# Patient Record
Sex: FEMALE | Race: WHITE | NOT HISPANIC OR LATINO | URBAN - METROPOLITAN AREA
[De-identification: names, ages, dates, MRNs, and addresses within clinical notes are randomized per-mention and may not be internally consistent; named-entity substitution may affect disease eponyms.]

---

## 2018-12-17 NOTE — H&P ADULT - NSHPPHYSICALEXAM_GEN_ALL_CORE
MSK: + decreased ROM secondary to pain, cervical spine      Remainder of exam per medical clearance note

## 2018-12-17 NOTE — H&P ADULT - HISTORY OF PRESENT ILLNESS
58F c.o neck pain x       Present for Anterior Cervical Discectomy with Fusion C4-C6 58F c.o neck pain x 3 years without radiation of pain.  Pt notes numbness and tingling down upper extremities left more severe than right.  Pt has been prescribed oxycodone and ibuprofen for her pain and states despite this her pain has failed to be well managed.    Pt has attempted and failed conservative treatment for her neck pain.  Present for Anterior Cervical Discectomy with Fusion C4-C6.  Upon further questioning with anesthesia pt states she has had an episode of severe SOB with accompanying chest pain on November 15 2018.  Anesthesia and Dr. Donaldson discussed the situation and decided to cancel the case until further pt follow up for possible cardiac stress test. 58F c.o neck pain x 3 years without radiation of pain.  Pt notes numbness and tingling down upper extremities left more severe than right.  Pt has been prescribed oxycodone and ibuprofen for her pain and states despite this her pain has failed to be well managed.  Pt denies fevers, chills, recent illness, cp, or SOB  Pt has attempted and failed conservative treatment for her neck pain.  Present for Anterior Cervical Discectomy with Fusion C4-C6.  Upon further questioning with anesthesia pt states she has had an episode of severe SOB with accompanying chest pain on November 15 2018.  Anesthesia and Dr. Donaldson discussed the situation and decided to cancel the case until further medical follow up for possible cardiac stress test.

## 2018-12-17 NOTE — H&P ADULT - PROBLEM SELECTOR PLAN 1
Admit to Orthopaedic Service.  Presents today for elective ACDF C4-C6  Pt medically stable and cleared for procedure today by  Admit to Orthopaedic Service.  Presents today for elective ACDF C4-C6  Pt medically stable and cleared for procedure today by Dr. NIK Donaldson Follow up outpatient for further medical workup and possible cardiac stress test.  Reschedule elective ACDF C4-C6 pending medical follow up.

## 2018-12-17 NOTE — H&P ADULT - PMH
Asthma, unspecified asthma severity, unspecified whether complicated, unspecified whether persistent    Attention deficit disorder, unspecified hyperactivity presence No pertinent past medical history

## 2018-12-18 ENCOUNTER — INPATIENT (INPATIENT)
Facility: HOSPITAL | Age: 58
LOS: 0 days | Discharge: HOME | DRG: 74 | End: 2018-12-18
Attending: ORTHOPAEDIC SURGERY | Admitting: ORTHOPAEDIC SURGERY
Payer: OTHER MISCELLANEOUS

## 2018-12-18 VITALS
RESPIRATION RATE: 18 BRPM | HEART RATE: 70 BPM | WEIGHT: 157.85 LBS | TEMPERATURE: 96 F | OXYGEN SATURATION: 97 % | HEIGHT: 64 IN | DIASTOLIC BLOOD PRESSURE: 67 MMHG | SYSTOLIC BLOOD PRESSURE: 115 MMHG

## 2018-12-18 DIAGNOSIS — M54.12 RADICULOPATHY, CERVICAL REGION: ICD-10-CM

## 2018-12-18 DIAGNOSIS — J45.909 UNSPECIFIED ASTHMA, UNCOMPLICATED: ICD-10-CM

## 2018-12-18 DIAGNOSIS — Z90.49 ACQUIRED ABSENCE OF OTHER SPECIFIED PARTS OF DIGESTIVE TRACT: Chronic | ICD-10-CM

## 2018-12-18 DIAGNOSIS — Z98.890 OTHER SPECIFIED POSTPROCEDURAL STATES: Chronic | ICD-10-CM

## 2018-12-18 PROCEDURE — 86850 RBC ANTIBODY SCREEN: CPT

## 2018-12-18 PROCEDURE — 86901 BLOOD TYPING SEROLOGIC RH(D): CPT

## 2018-12-18 PROCEDURE — 86900 BLOOD TYPING SEROLOGIC ABO: CPT

## 2018-12-20 DIAGNOSIS — J45.909 UNSPECIFIED ASTHMA, UNCOMPLICATED: ICD-10-CM

## 2018-12-20 DIAGNOSIS — M54.12 RADICULOPATHY, CERVICAL REGION: ICD-10-CM

## 2018-12-20 DIAGNOSIS — Z53.09 PROCEDURE AND TREATMENT NOT CARRIED OUT BECAUSE OF OTHER CONTRAINDICATION: ICD-10-CM

## 2018-12-20 DIAGNOSIS — Z79.899 OTHER LONG TERM (CURRENT) DRUG THERAPY: ICD-10-CM

## 2019-01-03 PROBLEM — M54.12 RADICULOPATHY, CERVICAL REGION: Chronic | Status: ACTIVE | Noted: 2018-12-17

## 2019-01-07 VITALS
WEIGHT: 161.6 LBS | DIASTOLIC BLOOD PRESSURE: 59 MMHG | OXYGEN SATURATION: 96 % | HEIGHT: 66 IN | TEMPERATURE: 97 F | SYSTOLIC BLOOD PRESSURE: 100 MMHG | HEART RATE: 79 BPM | RESPIRATION RATE: 16 BRPM

## 2019-01-07 NOTE — H&P ADULT - HISTORY OF PRESENT ILLNESS
58 year old female presents with neck pain x     Presents today for elective ACDF C4-C6. 58 year old female presents with neck pain x chronic. Pt endorses work related injury in 2013 while working as a  doing heavy lifting and repeated overhand movements. Pt endorses bilateral numbness/tingling/weakness of her upper extremities which is worse on her left. Endorses bilateral lower extremity pain that is also worse on the left side. Pt takes ibuprofen at home; pt does not ambulate with an assistive device at baseline. Denies DVT hx. Denies CP, SOB, N/V, tactile fevers. Pt has failed conservative treatment for her symptoms.    Presents today for elective ACDF C4-C6.

## 2019-01-07 NOTE — H&P ADULT - PROBLEM SELECTOR PLAN 1
Admit to Orthopaedic Service.  Presents today for elective ACDF C4-C6  Pt medically stable and cleared for procedure today by Dr. Donaldson

## 2019-01-07 NOTE — H&P ADULT - NSHPPHYSICALEXAM_GEN_ALL_CORE
MSK: +decreased ROM cervical spine secondary to pain   Remainder of physical exam as per medical clearance note MSK: +decreased ROM cervical spine secondary to pain   Sensation intact to bilateral UE distally, decreased at palmar aspect of left hand. Motor Strength 5/5 to /interossei/triceps/biceps/deltoid bilaterally. AIN/PIN intact.   Skin warm and well perfused, no visible wounds/erythema/ecchymoses  Cap refill < 2 sec bilateral upper extremities   Remainder of physical exam as per medical clearance note

## 2019-01-08 ENCOUNTER — INPATIENT (INPATIENT)
Facility: HOSPITAL | Age: 59
LOS: 4 days | Discharge: HOME | DRG: 30 | End: 2019-01-13
Attending: ORTHOPAEDIC SURGERY | Admitting: ORTHOPAEDIC SURGERY
Payer: OTHER MISCELLANEOUS

## 2019-01-08 DIAGNOSIS — Z98.890 OTHER SPECIFIED POSTPROCEDURAL STATES: Chronic | ICD-10-CM

## 2019-01-08 DIAGNOSIS — Z90.49 ACQUIRED ABSENCE OF OTHER SPECIFIED PARTS OF DIGESTIVE TRACT: Chronic | ICD-10-CM

## 2019-01-08 DIAGNOSIS — M54.12 RADICULOPATHY, CERVICAL REGION: ICD-10-CM

## 2019-01-08 LAB
ANION GAP SERPL CALC-SCNC: 13 MMOL/L — SIGNIFICANT CHANGE UP (ref 5–17)
BUN SERPL-MCNC: 20 MG/DL — SIGNIFICANT CHANGE UP (ref 7–23)
CALCIUM SERPL-MCNC: 8.3 MG/DL — LOW (ref 8.4–10.5)
CHLORIDE SERPL-SCNC: 104 MMOL/L — SIGNIFICANT CHANGE UP (ref 96–108)
CO2 SERPL-SCNC: 22 MMOL/L — SIGNIFICANT CHANGE UP (ref 22–31)
CREAT SERPL-MCNC: 0.6 MG/DL — SIGNIFICANT CHANGE UP (ref 0.5–1.3)
GLUCOSE SERPL-MCNC: 148 MG/DL — HIGH (ref 70–99)
POTASSIUM SERPL-MCNC: 4.3 MMOL/L — SIGNIFICANT CHANGE UP (ref 3.5–5.3)
POTASSIUM SERPL-SCNC: 4.3 MMOL/L — SIGNIFICANT CHANGE UP (ref 3.5–5.3)
SODIUM SERPL-SCNC: 139 MMOL/L — SIGNIFICANT CHANGE UP (ref 135–145)

## 2019-01-08 RX ORDER — METOCLOPRAMIDE HCL 10 MG
10 TABLET ORAL ONCE
Qty: 0 | Refills: 0 | Status: DISCONTINUED | OUTPATIENT
Start: 2019-01-08 | End: 2019-01-13

## 2019-01-08 RX ORDER — OXYCODONE HYDROCHLORIDE 5 MG/1
5 TABLET ORAL EVERY 4 HOURS
Qty: 0 | Refills: 0 | Status: DISCONTINUED | OUTPATIENT
Start: 2019-01-08 | End: 2019-01-13

## 2019-01-08 RX ORDER — HYDROMORPHONE HYDROCHLORIDE 2 MG/ML
0.5 INJECTION INTRAMUSCULAR; INTRAVENOUS; SUBCUTANEOUS
Qty: 0 | Refills: 0 | Status: DISCONTINUED | OUTPATIENT
Start: 2019-01-08 | End: 2019-01-13

## 2019-01-08 RX ORDER — MAGNESIUM HYDROXIDE 400 MG/1
30 TABLET, CHEWABLE ORAL EVERY 12 HOURS
Qty: 0 | Refills: 0 | Status: DISCONTINUED | OUTPATIENT
Start: 2019-01-08 | End: 2019-01-13

## 2019-01-08 RX ORDER — CEFAZOLIN SODIUM 1 G
2000 VIAL (EA) INJECTION EVERY 8 HOURS
Qty: 0 | Refills: 0 | Status: COMPLETED | OUTPATIENT
Start: 2019-01-08 | End: 2019-01-08

## 2019-01-08 RX ORDER — OXYCODONE HYDROCHLORIDE 5 MG/1
10 TABLET ORAL EVERY 4 HOURS
Qty: 0 | Refills: 0 | Status: DISCONTINUED | OUTPATIENT
Start: 2019-01-08 | End: 2019-01-13

## 2019-01-08 RX ORDER — BENZOCAINE AND MENTHOL 5; 1 G/100ML; G/100ML
1 LIQUID ORAL EVERY 4 HOURS
Qty: 0 | Refills: 0 | Status: DISCONTINUED | OUTPATIENT
Start: 2019-01-08 | End: 2019-01-13

## 2019-01-08 RX ORDER — DOCUSATE SODIUM 100 MG
100 CAPSULE ORAL THREE TIMES A DAY
Qty: 0 | Refills: 0 | Status: DISCONTINUED | OUTPATIENT
Start: 2019-01-08 | End: 2019-01-13

## 2019-01-08 RX ORDER — SODIUM CHLORIDE 9 MG/ML
1000 INJECTION, SOLUTION INTRAVENOUS
Qty: 0 | Refills: 0 | Status: DISCONTINUED | OUTPATIENT
Start: 2019-01-08 | End: 2019-01-10

## 2019-01-08 RX ORDER — CEFAZOLIN SODIUM 1 G
2000 VIAL (EA) INJECTION EVERY 8 HOURS
Qty: 0 | Refills: 0 | Status: DISCONTINUED | OUTPATIENT
Start: 2019-01-08 | End: 2019-01-08

## 2019-01-08 RX ORDER — SENNA PLUS 8.6 MG/1
2 TABLET ORAL AT BEDTIME
Qty: 0 | Refills: 0 | Status: DISCONTINUED | OUTPATIENT
Start: 2019-01-08 | End: 2019-01-13

## 2019-01-08 RX ORDER — DEXAMETHASONE 0.5 MG/5ML
10 ELIXIR ORAL EVERY 8 HOURS
Qty: 0 | Refills: 0 | Status: COMPLETED | OUTPATIENT
Start: 2019-01-08 | End: 2019-01-09

## 2019-01-08 RX ADMIN — HYDROMORPHONE HYDROCHLORIDE 0.5 MILLIGRAM(S): 2 INJECTION INTRAMUSCULAR; INTRAVENOUS; SUBCUTANEOUS at 20:40

## 2019-01-08 RX ADMIN — Medication 102 MILLIGRAM(S): at 15:34

## 2019-01-08 RX ADMIN — HYDROMORPHONE HYDROCHLORIDE 0.5 MILLIGRAM(S): 2 INJECTION INTRAMUSCULAR; INTRAVENOUS; SUBCUTANEOUS at 11:00

## 2019-01-08 RX ADMIN — BENZOCAINE AND MENTHOL 1 LOZENGE: 5; 1 LIQUID ORAL at 14:48

## 2019-01-08 RX ADMIN — OXYCODONE HYDROCHLORIDE 10 MILLIGRAM(S): 5 TABLET ORAL at 22:03

## 2019-01-08 RX ADMIN — Medication 100 MILLIGRAM(S): at 22:01

## 2019-01-08 RX ADMIN — HYDROMORPHONE HYDROCHLORIDE 0.5 MILLIGRAM(S): 2 INJECTION INTRAMUSCULAR; INTRAVENOUS; SUBCUTANEOUS at 10:26

## 2019-01-08 RX ADMIN — SODIUM CHLORIDE 125 MILLILITER(S): 9 INJECTION, SOLUTION INTRAVENOUS at 10:26

## 2019-01-08 RX ADMIN — HYDROMORPHONE HYDROCHLORIDE 0.5 MILLIGRAM(S): 2 INJECTION INTRAMUSCULAR; INTRAVENOUS; SUBCUTANEOUS at 15:44

## 2019-01-08 RX ADMIN — Medication 2000 MILLIGRAM(S): at 22:01

## 2019-01-08 RX ADMIN — Medication 102 MILLIGRAM(S): at 22:01

## 2019-01-08 RX ADMIN — OXYCODONE HYDROCHLORIDE 10 MILLIGRAM(S): 5 TABLET ORAL at 23:03

## 2019-01-08 RX ADMIN — OXYCODONE HYDROCHLORIDE 10 MILLIGRAM(S): 5 TABLET ORAL at 17:34

## 2019-01-08 RX ADMIN — Medication 2000 MILLIGRAM(S): at 15:35

## 2019-01-08 RX ADMIN — SENNA PLUS 2 TABLET(S): 8.6 TABLET ORAL at 22:01

## 2019-01-08 RX ADMIN — HYDROMORPHONE HYDROCHLORIDE 0.5 MILLIGRAM(S): 2 INJECTION INTRAMUSCULAR; INTRAVENOUS; SUBCUTANEOUS at 20:12

## 2019-01-08 RX ADMIN — OXYCODONE HYDROCHLORIDE 10 MILLIGRAM(S): 5 TABLET ORAL at 16:46

## 2019-01-08 RX ADMIN — HYDROMORPHONE HYDROCHLORIDE 0.5 MILLIGRAM(S): 2 INJECTION INTRAMUSCULAR; INTRAVENOUS; SUBCUTANEOUS at 14:44

## 2019-01-08 NOTE — PACU DISCHARGE NOTE - COMMENTS
report given to miss kong rn..  patient transported to [address] [place] [address] , monitored , by rn isabel and cna tg.

## 2019-01-08 NOTE — CONSULT NOTE ADULT - SUBJECTIVE AND OBJECTIVE BOX
Pain Management Consult Note - [facility] [phone]    Chief Complaint: Neck Pain    HPI: Patient seen and examined today, patient laying down in bed, in no apparent distress. Patient s/p ACDF C4-C6, post op Day0. Patient Axox3, dressing c,d,i. Reviewed pain medication regimen with patient, patient verbalized understanding.       Pain is ___ sharp __x__dull ___burning _x__achy ___ Intensity: ____ mild _x__mod _x__severe     Location _c___surgical site _c___cervical _____lumbar ____abd ____upper ext____lower ext    Worse with ___x_activity __x__movement _____physical therapy___ Rest    Improved with __x__medication _x___rest ____physical therapy      ROS: Const:  _-__febrile   Eyes:___ENT:___CV: _-__chest pain  Resp: __-__sob  GI:_-__nausea _-__vomiting __-_abd pain ___npo ___clears __full diet __bm  :___ Musk: _x__pain _x__spasm  Skin:___ Neuro:  _-__skldnvwo_-__tupthtqbd_-__ numbness _-__weakness _-__paresth  Psych:_-_anxiety  Endo:___ Heme:___Allergy:_________, _x__all others reviewed and negative      PAST MEDICAL & SURGICAL HISTORY:  Cervical radiculopathy  H/O arthroscopy of shoulder: left  History of appendectomy  CERVICAL [id number]  CERVICAL RADICULOPATHY  Handoff  Cervical radiculopathy  No pertinent past medical history  Attention deficit disorder, unspecified hyperactivity presence  Asthma, unspecified asthma severity, unspecified whether complicated, unspecified whether persistent  Cervical disc herniation  Cervical disc herniation  Cervical radiculopathy  Cervical discectomy with fusion of cervical spine  H/O arthroscopy of shoulder  History of appendectomy      SH: _-__Tobacco   _-__Alcohol                          FH:FAMILY HISTORY:      dexamethasone  IVPB 10 milliGRAM(s) IV Intermittent every 8 hours  HYDROmorphone  Injectable 0.5 milliGRAM(s) IV Push every 15 minutes PRN  HYDROmorphone  Injectable 0.5 milliGRAM(s) IV Push every 2 hours PRN  lactated ringers. 1000 milliLiter(s) IV Continuous <Continuous>  metoclopramide Injectable 10 milliGRAM(s) IV Push once PRN  oxyCODONE    IR 5 milliGRAM(s) Oral every 4 hours PRN  oxyCODONE    IR 10 milliGRAM(s) Oral every 4 hours PRN      T(C): 36.6 (01-08-19 @ 10:30), Max: 36.6 (01-08-19 @ 10:30)  HR: 54 (01-08-19 @ 10:45) (54 - 79)  BP: 137/89 (01-08-19 @ 10:45) (100/59 - 156/82)  RR: 14 (01-08-19 @ 10:45) (11 - 16)  SpO2: 97% (01-08-19 @ 10:45) (94% - 98%)  Wt(kg): --    T(C): 36.6 (01-08-19 @ 10:30), Max: 36.6 (01-08-19 @ 10:30)  HR: 54 (01-08-19 @ 10:45) (54 - 79)  BP: 137/89 (01-08-19 @ 10:45) (100/59 - 156/82)  RR: 14 (01-08-19 @ 10:45) (11 - 16)  SpO2: 97% (01-08-19 @ 10:45) (94% - 98%)  Wt(kg): --    T(C): 36.6 (01-08-19 @ 10:30), Max: 36.6 (01-08-19 @ 10:30)  HR: 54 (01-08-19 @ 10:45) (54 - 79)  BP: 137/89 (01-08-19 @ 10:45) (100/59 - 156/82)  RR: 14 (01-08-19 @ 10:45) (11 - 16)  SpO2: 97% (01-08-19 @ 10:45) (94% - 98%)  Wt(kg): --    PHYSICAL EXAM:  Gen Appearance: _x__no acute distress x___appropriate        Neuro: _x__SILT feet____ EOM Intact Psych: AAOX_3_, _x__mood/affect appropriate        Eyes: _x__conjunctiva WNL  __x___ Pupils equal and round        ENT: _x__ears and nose atraumatic_x__ Hearing grossly intact        Neck: _x__trachea midline, no visible masses ___thyroid without palpable mass    Resp: _x__Nml WOB____No tactile fremitus ___clear to auscultation    Cardio: _x__extremities free from edema __x__pedal pulses palpable    GI/Abdomen: _x__soft ___x__ Nontender___x___Nondistended_____HSM    Lymphatic: ___no palpable nodes in neck  _x__no palpable nodes calves and feet    Skin/Wound: _x__Incision, _x__Dressing c/d/i,   __x__surrounding tissues soft,  ___drain/chest tube present____    Muscular: EHL __5_/5  Gastrocnemius_5__/5    ___absent clubbing/cyanosis      ASSESSMENT: This is a 58y old Female with a history of cervical radiculopathy, s/p ACDF C4-C6, post op day0.      Recommended Treatment PLAN:  1. Oxycodone 5-10mg Po Q4h prn moderate to severe pain   2. Dilaudid 0.5mg Q2h IVP prn breakthrough pain   Plan discussed with Dr. Parris Luciano

## 2019-01-08 NOTE — PROGRESS NOTE ADULT - SUBJECTIVE AND OBJECTIVE BOX
Orthopedics Post Op Check    Procedure: ACDF C4-C6  Surgeon: WILLIAN    Pt. stable, c/o sore throat and pain in neck region. Pt. states she still has numbness in left palm.    Denies any SOB/CP/nausea/vomiting.     Vital Signs Last 24 Hrs  T(C): 36.5 (08 Jan 2019 12:32), Max: 36.6 (08 Jan 2019 10:30)  T(F): 97.7 (08 Jan 2019 12:32), Max: 97.9 (08 Jan 2019 10:30)  HR: 62 (08 Jan 2019 12:32) (54 - 79)  BP: 124/71 (08 Jan 2019 12:32) (100/59 - 156/82)  BP(mean): 75 (08 Jan 2019 12:00) (75 - 128)  RR: 16 (08 Jan 2019 12:32) (9 - 16)  SpO2: 98% (08 Jan 2019 12:32) (94% - 98%)      Dressing C/D/I with 1 drain     Pulses: Brachial/Radial 2+  SLT: intact   Motor: /Biceps/triceps/Delts 5/5 B/L UES     01-08    139  |  104  |  20  ----------------------------<  148<H>  4.3   |  22  |  0.60    Ca    8.3<L>      08 Jan 2019 11:12      Post op XR: pending POD #1    A/P: 58yoFemale POD#0 s/p ACDF C4-C6  - Stable  - Pain Control  - DVT ppx: SCDS  - Post op abx: ANCEF  - PT, WBS: WBAT   - F/U AM Labs

## 2019-01-08 NOTE — BRIEF OPERATIVE NOTE - PROCEDURE
<<-----Click on this checkbox to enter Procedure Cervical discectomy with fusion of cervical spine  01/08/2019    Active  YOBANY

## 2019-01-09 LAB
ANION GAP SERPL CALC-SCNC: 15 MMOL/L — SIGNIFICANT CHANGE UP (ref 5–17)
BASOPHILS NFR BLD AUTO: 0.1 % — SIGNIFICANT CHANGE UP (ref 0–2)
BUN SERPL-MCNC: 12 MG/DL — SIGNIFICANT CHANGE UP (ref 7–23)
CALCIUM SERPL-MCNC: 8.7 MG/DL — SIGNIFICANT CHANGE UP (ref 8.4–10.5)
CHLORIDE SERPL-SCNC: 100 MMOL/L — SIGNIFICANT CHANGE UP (ref 96–108)
CO2 SERPL-SCNC: 23 MMOL/L — SIGNIFICANT CHANGE UP (ref 22–31)
CREAT SERPL-MCNC: 0.47 MG/DL — LOW (ref 0.5–1.3)
GLUCOSE SERPL-MCNC: 155 MG/DL — HIGH (ref 70–99)
HCT VFR BLD CALC: 39.2 % — SIGNIFICANT CHANGE UP (ref 34.5–45)
HGB BLD-MCNC: 12.5 G/DL — SIGNIFICANT CHANGE UP (ref 11.5–15.5)
LYMPHOCYTES # BLD AUTO: 14.8 % — SIGNIFICANT CHANGE UP (ref 13–44)
MCHC RBC-ENTMCNC: 29.1 PG — SIGNIFICANT CHANGE UP (ref 27–34)
MCHC RBC-ENTMCNC: 31.9 G/DL — LOW (ref 32–36)
MCV RBC AUTO: 91.2 FL — SIGNIFICANT CHANGE UP (ref 80–100)
MONOCYTES NFR BLD AUTO: 2.2 % — SIGNIFICANT CHANGE UP (ref 2–14)
NEUTROPHILS NFR BLD AUTO: 82.9 % — HIGH (ref 43–77)
PLATELET # BLD AUTO: 231 K/UL — SIGNIFICANT CHANGE UP (ref 150–400)
POTASSIUM SERPL-MCNC: 4 MMOL/L — SIGNIFICANT CHANGE UP (ref 3.5–5.3)
POTASSIUM SERPL-SCNC: 4 MMOL/L — SIGNIFICANT CHANGE UP (ref 3.5–5.3)
RBC # BLD: 4.3 M/UL — SIGNIFICANT CHANGE UP (ref 3.8–5.2)
RBC # FLD: 14.8 % — SIGNIFICANT CHANGE UP (ref 10.3–16.9)
SODIUM SERPL-SCNC: 138 MMOL/L — SIGNIFICANT CHANGE UP (ref 135–145)
WBC # BLD: 13.2 K/UL — HIGH (ref 3.8–10.5)
WBC # FLD AUTO: 13.2 K/UL — HIGH (ref 3.8–10.5)

## 2019-01-09 PROCEDURE — 99223 1ST HOSP IP/OBS HIGH 75: CPT

## 2019-01-09 PROCEDURE — 72040 X-RAY EXAM NECK SPINE 2-3 VW: CPT | Mod: 26

## 2019-01-09 RX ORDER — DIAZEPAM 5 MG
5 TABLET ORAL EVERY 8 HOURS
Qty: 0 | Refills: 0 | Status: DISCONTINUED | OUTPATIENT
Start: 2019-01-09 | End: 2019-01-10

## 2019-01-09 RX ADMIN — OXYCODONE HYDROCHLORIDE 10 MILLIGRAM(S): 5 TABLET ORAL at 14:09

## 2019-01-09 RX ADMIN — Medication 5 MILLIGRAM(S): at 09:57

## 2019-01-09 RX ADMIN — SENNA PLUS 2 TABLET(S): 8.6 TABLET ORAL at 22:03

## 2019-01-09 RX ADMIN — OXYCODONE HYDROCHLORIDE 10 MILLIGRAM(S): 5 TABLET ORAL at 05:44

## 2019-01-09 RX ADMIN — Medication 102 MILLIGRAM(S): at 05:45

## 2019-01-09 RX ADMIN — MAGNESIUM HYDROXIDE 30 MILLILITER(S): 400 TABLET, CHEWABLE ORAL at 05:48

## 2019-01-09 RX ADMIN — OXYCODONE HYDROCHLORIDE 10 MILLIGRAM(S): 5 TABLET ORAL at 15:09

## 2019-01-09 RX ADMIN — Medication 100 MILLIGRAM(S): at 22:02

## 2019-01-09 RX ADMIN — OXYCODONE HYDROCHLORIDE 10 MILLIGRAM(S): 5 TABLET ORAL at 06:44

## 2019-01-09 RX ADMIN — Medication 100 MILLIGRAM(S): at 05:44

## 2019-01-09 RX ADMIN — OXYCODONE HYDROCHLORIDE 10 MILLIGRAM(S): 5 TABLET ORAL at 23:00

## 2019-01-09 RX ADMIN — Medication 100 MILLIGRAM(S): at 14:09

## 2019-01-09 RX ADMIN — OXYCODONE HYDROCHLORIDE 10 MILLIGRAM(S): 5 TABLET ORAL at 22:05

## 2019-01-09 RX ADMIN — BENZOCAINE AND MENTHOL 1 LOZENGE: 5; 1 LIQUID ORAL at 09:57

## 2019-01-09 RX ADMIN — Medication 5 MILLIGRAM(S): at 18:09

## 2019-01-09 NOTE — DISCHARGE NOTE ADULT - CARE PROVIDER_API CALL
Leoncio Donaldson (DO), Orthopaedic Surgery  [address] [place], [place] [id number]  Phone: [phone]  Fax: [phone]

## 2019-01-09 NOTE — SWALLOW FEES ASSESSMENT ADULT - DIAGNOSTIC IMPRESSIONS
Pt p/w a mild pharyngeal dysphagia characterized by pharyngeal residue after the swallow, greater for puree than thin, likely due to decreased pharyngeal squeeze in setting of ACDF on 1/8/19. Residue is reduced with secondary swallow & a liquid wash. Suspect that swallow function will return to baseline over the next week to two weeks. Pt is safe to continue with an oral diet.

## 2019-01-09 NOTE — SWALLOW FEES ASSESSMENT ADULT - PHARYNGEAL PHASE COMMENTS
No penetration or aspiration observed. Mild pharyngeal residue noted throughout the pharynx after the swallow, greater for puree than thin, likely r/t pharyngeal edema causing decreased squeeze.

## 2019-01-09 NOTE — DISCHARGE NOTE ADULT - PATIENT PORTAL LINK FT
You can access the [facility] Patient Portal, offered by [facility], by registering with the following website: [email or web address]

## 2019-01-09 NOTE — PROGRESS NOTE ADULT - SUBJECTIVE AND OBJECTIVE BOX
Pt seen and examined. Pt reported muscle tightness and pain in left shoulder. Pt seen and examined. Pt reported muscle tightness and pain in left shoulder. Pt reported sore throat and discomfort with swallowing.    Focused exam:  ACDF C4-6 cervical collar in place  Dsg c/d/i  HVx1  b/l UE 5/5 , bi/tri/delt  b/l UE SILT and WWP      A/P: 59yo female s/p ACDF C4-6    -DVT PPX: SCDs  -WBS: WBAT  -Pain control: Recs appreciated from Pain Mgmt team, Valium added for muscle tightness  -Disp: Pending    Post-op xrays gained.  Dr. Donaldson to bedside, d/t pt's discomfort with swallowing diet to be slowly advanced and ENT consult to be gained. Pt provided with cepacol lozenges for sore throat. Post-op xrays to be gained on 1/10/19.       Recs from Dr. Quezada appreciated.

## 2019-01-09 NOTE — SWALLOW FEES ASSESSMENT ADULT - COMMENTS
Mild pharyngeal edema, ?hypomobile left TVF (please see ENT note for additional documentation) +LEP but able to participate in at least semi-complex conversation in English. Voice quality is WNL, loudness is mildly reduced. Pt seen in coordination with ENT residents at request of Dr. Dixon to visualize TVF movement & visualize pharyngeal swallow. Pt was informed of purpose of exam & provided verbal consent for scope. She tolerated the passing & presence of the scope without difficulty.

## 2019-01-09 NOTE — DISCHARGE NOTE ADULT - MEDICATION SUMMARY - MEDICATIONS TO TAKE
I will START or STAY ON the medications listed below when I get home from the hospital:    Arlene loomis  s/p ACDF   -- Indication: For Spine surgery    MethylPREDNISolone Dose Pack 4 mg oral tablet  -- Please follow steroid dose pack instructions  -- It is very important that you take or use this exactly as directed.  Do not skip doses or discontinue unless directed by your doctor.  Obtain medical advice before taking any non-prescription drugs as some may affect the action of this medication.  Take with food or milk.    -- Indication: For Steroid    oxyCODONE 5 mg oral tablet  -- 1 tab (5mg) po q6h prn moderate pain (4-6) or 2 tabs (10mg) po q6h prn severe pain (7-10), wean off as isak MDD:5 tabs  -- Indication: For Pain    lidocaine 5% topical film  -- Apply patch to L Trap, on for 12 hours off for 12 hours, as needed for pain  -- Indication: For Pain    senna oral tablet  -- 2 tab(s) by mouth once a day (at bedtime)  -- Indication: For Constipation    docusate sodium 100 mg oral capsule  -- 1 cap(s) by mouth 3 times a day  -- Indication: For Constipation I will START or STAY ON the medications listed below when I get home from the hospital:    Arlene loomis  s/p ACDF   -- Indication: For Spine surgery    MethylPREDNISolone Dose Pack 4 mg oral tablet  -- Please follow steroid dose pack instructions  -- It is very important that you take or use this exactly as directed.  Do not skip doses or discontinue unless directed by your doctor.  Obtain medical advice before taking any non-prescription drugs as some may affect the action of this medication.  Take with food or milk.    -- Indication: For Postoperative care    oxyCODONE 5 mg oral tablet  -- 1 tab (5mg) po q6h prn moderate pain (4-6) or 2 tabs (10mg) po q6h prn severe pain (7-10), wean off as isak MDD:5 tabs  -- Indication: For postoperative pain control    lidocaine 5% topical film  -- Apply patch to L Trap, on for 12 hours off for 12 hours, as needed for pain  -- Indication: For Pain    senna oral tablet  -- 2 tab(s) by mouth once a day (at bedtime)  -- Indication: For Constipation    docusate sodium 100 mg oral capsule  -- 1 cap(s) by mouth 3 times a day  -- Indication: For Constipation    Colace 100 mg oral capsule  -- 1 cap(s) by mouth 3 times a day, As Needed -for constipation   -- Medication should be taken with plenty of water.    -- Indication: For Constipation    senna oral tablet  -- 2 tab(s) by mouth once a day (at bedtime), As Needed -for constipation   -- Indication: For Constipation

## 2019-01-09 NOTE — PROGRESS NOTE ADULT - SUBJECTIVE AND OBJECTIVE BOX
pt seen and examined nad avss some issues with swallowing     pe dressing cdi   nvi   power 5/5     sensation grossly intact   no calf tenderness    imp sp acdf    plan   drain  pt   pain control  scd  xrays   ent consult

## 2019-01-09 NOTE — DISCHARGE NOTE ADULT - HOSPITAL COURSE
Admitted  Surgery - underwent ACDF  Erika-op Antibiotics  Pain control  DVT prophylaxis  OOB/Physical Therapy Admitted  Surgery - underwent ACDF  Erika-op Antibiotics  Pain control  DVT prophylaxis  OOB  Consults: Pain Mgmt, IM, ENT

## 2019-01-09 NOTE — CONSULT NOTE ADULT - SUBJECTIVE AND OBJECTIVE BOX
CC: Complaining of throat pain and left shoulder pain.   No other complaints.   Denies cp, sob, dizziness.   Rest of ROS negative.     Vital Signs Last 24 Hrs  T(C): 36.6 (09 Jan 2019 09:53), Max: 37.4 (08 Jan 2019 21:05)  T(F): 97.8 (09 Jan 2019 09:53), Max: 99.3 (08 Jan 2019 21:05)  HR: 77 (09 Jan 2019 09:53) (54 - 77)  BP: 115/56 (09 Jan 2019 09:53) (99/54 - 150/82)  BP(mean): 75 (08 Jan 2019 12:00) (75 - 117)  RR: 16 (09 Jan 2019 09:53) (9 - 17)  SpO2: 95% (09 Jan 2019 09:53) (93% - 98%)    PHYSICAL EXAMINATION  * General: Not in acute distress. Awake and alert. Lying comfortably in bed.  * Neck: C-collar. Drain in place.   * Lungs: Clear to auscultation, no rales, no wheezes.  * Cardio: Regular rate and rhythm, no murmurs, no rubs, no gallops. Good peripheral pulses.  * Abdomen: Soft, non-tender, non-distended, tympanic to percussion, no rebound, no guarding, no rigidity. Bowel sounds present. No suprapubic or CVA tenderness.  * : Deferred.  * Extremities: Acyanotic, no edema.  * Skin: Warm and dry.  * Neuro: Alert and oriented x 3. No focal deficits. Motor strength is 5/5 throughout. Sensation intact. Cranial nerves II-XII grossly intact.                           12.5   13.2  )-----------( 231      ( 09 Jan 2019 05:40 )             39.2   01-09    138  |  100  |  12  ----------------------------<  155<H>  4.0   |  23  |  0.47<L>    Ca    8.7      09 Jan 2019 05:40    MEDICATIONS  (STANDING):  diazepam    Tablet 5 milliGRAM(s) Oral every 8 hours  docusate sodium 100 milliGRAM(s) Oral three times a day  lactated ringers. 1000 milliLiter(s) (125 mL/Hr) IV Continuous <Continuous>  multivitamin 1 Tablet(s) Oral daily  senna 2 Tablet(s) Oral at bedtime    MEDICATIONS  (PRN):  aluminum hydroxide/magnesium hydroxide/simethicone Suspension 30 milliLiter(s) Oral every 12 hours PRN Indigestion  benzocaine 15 mG/menthol 3.6 mG (Sugar-Free) Lozenge 1 Lozenge Oral every 4 hours PRN Sore Throat  HYDROmorphone  Injectable 0.5 milliGRAM(s) IV Push every 15 minutes PRN BREAKTHROUGH PAIN  HYDROmorphone  Injectable 0.5 milliGRAM(s) IV Push every 2 hours PRN breakthrough pain  magnesium hydroxide Suspension 30 milliLiter(s) Oral every 12 hours PRN Constipation  metoclopramide Injectable 10 milliGRAM(s) IV Push once PRN Nausea and/or Vomiting  oxyCODONE    IR 5 milliGRAM(s) Oral every 4 hours PRN Moderate Pain (4 - 6)  oxyCODONE    IR 10 milliGRAM(s) Oral every 4 hours PRN Severe Pain (7 - 10)

## 2019-01-09 NOTE — SWALLOW FEES ASSESSMENT ADULT - RECOMMENDED CONSISTENCY
Puree / thin liquids. Pt agreed that she would like to try pureed foods and will notify RN/MD if she has poor tolerance of solids over a whole meal & would prefer to stay on full liquid diet for another 1-2 days.

## 2019-01-09 NOTE — DISCHARGE NOTE ADULT - ADDITIONAL INSTRUCTIONS
No strenuous activity (bending/twisting), heavy lifting, driving or returning to work until cleared by MD.  Change dressing daily with gauze/tape or medipore dressing until post-op day #5, then leave incision open to air.  You may shower post-op day#5, keep incision clean and dry.   Try to have regular bowel movements, take stool softener or laxative if necessary.  May take pepcid or zantac for upset stomach.  May apply ice to affected areas to decrease swelling.  Call to schedule an appointment with  ________ for follow up, if you have staples or sutures they will be removed in office.  Contact your doctor if you experience: fever greater than 101.5, chills, chest pain, difficulty breathing, redness or excessive drainage around the incision, other concerns.  Follow up with your primary care provider. **Your medications have been sent to [facility], which is on the first floor of [facility], please  at the time of discharge.    No strenuous activity (bending/twisting), heavy lifting, driving or returning to work until cleared by MD.  Change dressing daily with gauze/tape or medipore dressing until post-op day #5, then leave incision open to air. Continue to wear collar until cleared by Dr. Donaldson or Dr. Torres.  You may shower post-op day#5, keep incision clean and dry (you may take the collar off to shower).     Try to have regular bowel movements, take stool softener or laxative if necessary. May take pepcid or zantac for upset stomach.    May apply ice to affected areas to decrease swelling.    Call to schedule an appointment with Dr. Donaldson/Dr. Torers for follow up, if you have staples or sutures they will be removed in office.    Contact your doctor if you experience: fever greater than 101.5, chills, chest pain, difficulty breathing, redness or excessive drainage around the incision, other concerns.    Follow up with your Primary Care Provider.

## 2019-01-09 NOTE — PROGRESS NOTE ADULT - SUBJECTIVE AND OBJECTIVE BOX
Pain Management Progress Note - [facility] [phone]    HPI: Patient seen and examined today, patient laying down in bed, in no apparent distress. Patient s/p ACDF C4-C6, post op Day1. Patient Axox3, denies n,v. dressing c,d,i, patient complains of incisional pain and soreness when swallowing but expresses pain relief with current pain medication regimen.         Pain is ___ sharp __x__dull ___burning _x__achy ___ Intensity: ____ mild _x__mod _x__severe     Location _x___surgical site _x___cervical _____lumbar ____abd ____upper ext____lower ext    Worse with ___x_activity __x__movement _____physical therapy___ Rest    Improved with __x__medication _x___rest ____physical therapy      lactated ringers.  HYDROmorphone  Injectable  ceFAZolin   IVPB  dexamethasone  IVPB  aluminum hydroxide/magnesium hydroxide/simethicone Suspension  metoclopramide Injectable  docusate sodium  magnesium hydroxide Suspension  senna  multivitamin  oxyCODONE    IR  HYDROmorphone  Injectable  ceFAZolin  Injectable.  benzocaine 15 mG/menthol 3.6 mG (Sugar-Free) Lozenge  diazepam    Tablet      ROS: Const:  _-__febrile   Eyes:___ENT:___CV: _-__chest pain  Resp: __-__sob  GI:_-__nausea _-__vomiting __-_abd pain ___npo ___clears _x_full diet __bm  :___ Musk: _x__pain _x__spasm  Skin:___ Neuro:  _-__iemgirea_-__diyrnqjnj_-__ numbness _-__weakness _-__paresth  Psych:_-_anxiety  Endo:___ Heme:___Allergy:_________, _x__all others reviewed and negative      PAST MEDICAL & SURGICAL HISTORY:  Cervical radiculopathy  H/O arthroscopy of shoulder: left  History of appendectomy  CERVICAL [id number]  CERVICAL RADICULOPATHY  Handoff  Cervical radiculopathy  No pertinent past medical history  Attention deficit disorder, unspecified hyperactivity presence  Asthma, unspecified asthma severity, unspecified whether complicated, unspecified whether persistent  Cervical disc herniation  Cervical disc herniation  Cervical radiculopathy  Cervical discectomy with fusion of cervical spine  H/O arthroscopy of shoulder  History of appendectomy        01-09 @ 05:73927 mL/min/1.73M2      01-08 @ 11:62403 mL/min/1.73M2      Hemoglobin: 12.5 g/dL (01-09 @ 05:40)        T(C): 36.6 (01-09-19 @ 09:53), Max: 37.4 (01-08-19 @ 21:05)  HR: 77 (01-09-19 @ 09:53) (54 - 77)  BP: 115/56 (01-09-19 @ 09:53) (99/54 - 137/89)  RR: 16 (01-09-19 @ 09:53) (9 - 17)  SpO2: 95% (01-09-19 @ 09:53) (93% - 98%)  Wt(kg): --       PHYSICAL EXAM:  Gen Appearance: _x__no acute distress x___appropriate        Neuro: _x__SILT feet____ EOM Intact Psych: AAOX_3_, _x__mood/affect appropriate        Eyes: _x__conjunctiva WNL  __x___ Pupils equal and round        ENT: _x__ears and nose atraumatic_x__ Hearing grossly intact        Neck: _x__trachea midline, no visible masses ___thyroid without palpable mass    Resp: _x__Nml WOB____No tactile fremitus ___clear to auscultation    Cardio: _x__extremities free from edema __x__pedal pulses palpable    GI/Abdomen: _x__soft ___x__ Nontender___x___Nondistended_____HSM    Lymphatic: ___no palpable nodes in neck  _x__no palpable nodes calves and feet    Skin/Wound: _x__Incision, _x__Dressing c/d/i,   __x__surrounding tissues soft,  _x__drain/chest tube present____    Muscular: EHL __5_/5  Gastrocnemius_5__/5    ___absent clubbing/cyanosis      ASSESSMENT: This is a 58y old Female with a history of cervical radiculopathy, s/p ACDF C4-C6, post op day 1, pain controlled with current pain medication regimen.      Recommended Treatment PLAN:  1. Oxycodone 5-10mg Po Q4h prn moderate to severe pain   2. Dilaudid 0.5mg Q2h IVP prn breakthrough pain   Plan discussed with Dr. Parris Luciano

## 2019-01-09 NOTE — PROGRESS NOTE ADULT - SUBJECTIVE AND OBJECTIVE BOX
HPI: 58F POD 1 s/p ACDF approach for discectomy and cervical fusion with orthopedics. ENT involved for ACDF approach.  Pt tolerated procedure well without intra-operative complication.     Interval HPI: 1/9: AFVSS.  Pt had dysphagia postoperatively prompting evaluation by SLP.  ENT was bedside during evaluation revealing normal clearance of liquids and soft foods through oropharynx without aspiration.       INTERVAL HPI/OVERNIGHT EVENTS:    MEDICATIONS  (STANDING):  diazepam    Tablet 5 milliGRAM(s) Oral every 8 hours  docusate sodium 100 milliGRAM(s) Oral three times a day  lactated ringers. 1000 milliLiter(s) (125 mL/Hr) IV Continuous <Continuous>  multivitamin 1 Tablet(s) Oral daily  senna 2 Tablet(s) Oral at bedtime    MEDICATIONS  (PRN):  aluminum hydroxide/magnesium hydroxide/simethicone Suspension 30 milliLiter(s) Oral every 12 hours PRN Indigestion  benzocaine 15 mG/menthol 3.6 mG (Sugar-Free) Lozenge 1 Lozenge Oral every 4 hours PRN Sore Throat  HYDROmorphone  Injectable 0.5 milliGRAM(s) IV Push every 15 minutes PRN BREAKTHROUGH PAIN  HYDROmorphone  Injectable 0.5 milliGRAM(s) IV Push every 2 hours PRN breakthrough pain  magnesium hydroxide Suspension 30 milliLiter(s) Oral every 12 hours PRN Constipation  metoclopramide Injectable 10 milliGRAM(s) IV Push once PRN Nausea and/or Vomiting  oxyCODONE    IR 5 milliGRAM(s) Oral every 4 hours PRN Moderate Pain (4 - 6)  oxyCODONE    IR 10 milliGRAM(s) Oral every 4 hours PRN Severe Pain (7 - 10)      Allergies    No Known Allergies    Intolerances    REVIEW OF SYSTEMS:  All other systems reviewed and found to be negative.      Vital Signs Last 24 Hrs  T(C): 36.9 (09 Jan 2019 12:29), Max: 37.4 (08 Jan 2019 21:05)  T(F): 98.5 (09 Jan 2019 12:29), Max: 99.3 (08 Jan 2019 21:05)  HR: 70 (09 Jan 2019 12:29) (69 - 77)  BP: 93/56 (09 Jan 2019 12:45) (93/56 - 115/56)  BP(mean): --  RR: 16 (09 Jan 2019 12:29) (16 - 17)  SpO2: 96% (09 Jan 2019 12:29) (93% - 97%)    Physical Exam   Gen: NAD, A+OX3   Head: normocephalic, atraumatic CN 2-12 grossly intact bilaterally, HB1/6  Nose: clear to anterior rhinoscopy  OC/Op: healthy oral mucosa, no masses / lesions   Neck: soft and flat, incision covered with dressing without saturation.  Hard collar in place.     LABS:                        12.5   13.2  )-----------( 231      ( 09 Jan 2019 05:40 )             39.2     01-09    138  |  100  |  12  ----------------------------<  155<H>  4.0   |  23  |  0.47<L>    Ca    8.7      09 Jan 2019 05:30    A/P:  58F POD 1 s/p ACDF approach to cervical discectomy and cervical fusion.  doing well.   - ENT involved for approach to ACDF  - ENT will follow for wound checks  - Diet per SLP  - All other care per primary team.       D/W Attending whom agrees with above.     PPX with IS, SCDs and HSQ HPI: 58F POD 1 s/p ACDF approach for discectomy and cervical fusion with orthopedics. ENT involved for ACDF approach.  Pt tolerated procedure well without intra-operative complication.     Interval HPI: 1/9: AFVSS.  Pt had dysphagia postoperatively prompting evaluation by SLP.  ENT was bedside during evaluation revealing normal clearance of liquids and soft foods through oropharynx without aspiration.       INTERVAL HPI/OVERNIGHT EVENTS:    MEDICATIONS  (STANDING):  diazepam    Tablet 5 milliGRAM(s) Oral every 8 hours  docusate sodium 100 milliGRAM(s) Oral three times a day  lactated ringers. 1000 milliLiter(s) (125 mL/Hr) IV Continuous <Continuous>  multivitamin 1 Tablet(s) Oral daily  senna 2 Tablet(s) Oral at bedtime    MEDICATIONS  (PRN):  aluminum hydroxide/magnesium hydroxide/simethicone Suspension 30 milliLiter(s) Oral every 12 hours PRN Indigestion  benzocaine 15 mG/menthol 3.6 mG (Sugar-Free) Lozenge 1 Lozenge Oral every 4 hours PRN Sore Throat  HYDROmorphone  Injectable 0.5 milliGRAM(s) IV Push every 15 minutes PRN BREAKTHROUGH PAIN  HYDROmorphone  Injectable 0.5 milliGRAM(s) IV Push every 2 hours PRN breakthrough pain  magnesium hydroxide Suspension 30 milliLiter(s) Oral every 12 hours PRN Constipation  metoclopramide Injectable 10 milliGRAM(s) IV Push once PRN Nausea and/or Vomiting  oxyCODONE    IR 5 milliGRAM(s) Oral every 4 hours PRN Moderate Pain (4 - 6)  oxyCODONE    IR 10 milliGRAM(s) Oral every 4 hours PRN Severe Pain (7 - 10)      Allergies    No Known Allergies    Intolerances    REVIEW OF SYSTEMS:  All other systems reviewed and found to be negative.      Vital Signs Last 24 Hrs  T(C): 36.9 (09 Jan 2019 12:29), Max: 37.4 (08 Jan 2019 21:05)  T(F): 98.5 (09 Jan 2019 12:29), Max: 99.3 (08 Jan 2019 21:05)  HR: 70 (09 Jan 2019 12:29) (69 - 77)  BP: 93/56 (09 Jan 2019 12:45) (93/56 - 115/56)  BP(mean): --  RR: 16 (09 Jan 2019 12:29) (16 - 17)  SpO2: 96% (09 Jan 2019 12:29) (93% - 97%)    Physical Exam   Gen: NAD, A+OX3   Head: normocephalic, atraumatic CN 2-12 grossly intact bilaterally, HB1/6  Nose: clear to anterior rhinoscopy  OC/Op: healthy oral mucosa, no masses / lesions   Neck: soft and flat, incision covered with dressing without saturation.  Hard collar in place.   FFL: NP with minimal secretions, OP clear, Vallecula clear, epiglottis wnl, Pyriform sinuses clear bilaterally, AE folds and TVCs mobile bilaterally - left TVC with slight reduction in mobility.  Airway widely patent, Orally intubatable.  LABS:                        12.5   13.2  )-----------( 231      ( 09 Jan 2019 05:40 )             39.2     01-09    138  |  100  |  12  ----------------------------<  155<H>  4.0   |  23  |  0.47<L>    Ca    8.7      09 Jan 2019 05:30    A/P:  58F POD 1 s/p ACDF approach to cervical discectomy and cervical fusion.  doing well.   - ENT involved for approach to ACDF  - ENT will follow for wound checks  - Diet per SLP  - All other care per primary team.       D/W Attending whom agrees with above.     PPX with IS, SCDs and HSQ

## 2019-01-10 LAB
ALBUMIN SERPL ELPH-MCNC: 3.5 G/DL — SIGNIFICANT CHANGE UP (ref 3.3–5)
ALP SERPL-CCNC: 58 U/L — SIGNIFICANT CHANGE UP (ref 40–120)
ALT FLD-CCNC: 12 U/L — SIGNIFICANT CHANGE UP (ref 10–45)
ANION GAP SERPL CALC-SCNC: 14 MMOL/L — SIGNIFICANT CHANGE UP (ref 5–17)
AST SERPL-CCNC: 16 U/L — SIGNIFICANT CHANGE UP (ref 10–40)
BASOPHILS NFR BLD AUTO: 0.1 % — SIGNIFICANT CHANGE UP (ref 0–2)
BILIRUB SERPL-MCNC: 0.4 MG/DL — SIGNIFICANT CHANGE UP (ref 0.2–1.2)
BUN SERPL-MCNC: 16 MG/DL — SIGNIFICANT CHANGE UP (ref 7–23)
CALCIUM SERPL-MCNC: 8.9 MG/DL — SIGNIFICANT CHANGE UP (ref 8.4–10.5)
CHLORIDE SERPL-SCNC: 101 MMOL/L — SIGNIFICANT CHANGE UP (ref 96–108)
CO2 SERPL-SCNC: 26 MMOL/L — SIGNIFICANT CHANGE UP (ref 22–31)
CREAT SERPL-MCNC: 0.56 MG/DL — SIGNIFICANT CHANGE UP (ref 0.5–1.3)
EOSINOPHIL NFR BLD AUTO: 0.1 % — SIGNIFICANT CHANGE UP (ref 0–6)
GLUCOSE SERPL-MCNC: 94 MG/DL — SIGNIFICANT CHANGE UP (ref 70–99)
HCT VFR BLD CALC: 38.4 % — SIGNIFICANT CHANGE UP (ref 34.5–45)
HGB BLD-MCNC: 12.2 G/DL — SIGNIFICANT CHANGE UP (ref 11.5–15.5)
LYMPHOCYTES # BLD AUTO: 28.8 % — SIGNIFICANT CHANGE UP (ref 13–44)
MCHC RBC-ENTMCNC: 29.1 PG — SIGNIFICANT CHANGE UP (ref 27–34)
MCHC RBC-ENTMCNC: 31.8 G/DL — LOW (ref 32–36)
MCV RBC AUTO: 91.6 FL — SIGNIFICANT CHANGE UP (ref 80–100)
MONOCYTES NFR BLD AUTO: 5.9 % — SIGNIFICANT CHANGE UP (ref 2–14)
NEUTROPHILS NFR BLD AUTO: 65.1 % — SIGNIFICANT CHANGE UP (ref 43–77)
PLATELET # BLD AUTO: 219 K/UL — SIGNIFICANT CHANGE UP (ref 150–400)
POTASSIUM SERPL-MCNC: 3.8 MMOL/L — SIGNIFICANT CHANGE UP (ref 3.5–5.3)
POTASSIUM SERPL-SCNC: 3.8 MMOL/L — SIGNIFICANT CHANGE UP (ref 3.5–5.3)
PROT SERPL-MCNC: 6.2 G/DL — SIGNIFICANT CHANGE UP (ref 6–8.3)
RBC # BLD: 4.19 M/UL — SIGNIFICANT CHANGE UP (ref 3.8–5.2)
RBC # FLD: 14.9 % — SIGNIFICANT CHANGE UP (ref 10.3–16.9)
SODIUM SERPL-SCNC: 141 MMOL/L — SIGNIFICANT CHANGE UP (ref 135–145)
SURGICAL PATHOLOGY STUDY: SIGNIFICANT CHANGE UP
WBC # BLD: 15.9 K/UL — HIGH (ref 3.8–10.5)
WBC # FLD AUTO: 15.9 K/UL — HIGH (ref 3.8–10.5)

## 2019-01-10 PROCEDURE — 99233 SBSQ HOSP IP/OBS HIGH 50: CPT | Mod: GC

## 2019-01-10 RX ORDER — DIAZEPAM 5 MG
5 TABLET ORAL EVERY 8 HOURS
Qty: 0 | Refills: 0 | Status: DISCONTINUED | OUTPATIENT
Start: 2019-01-10 | End: 2019-01-11

## 2019-01-10 RX ORDER — DEXAMETHASONE 0.5 MG/5ML
10 ELIXIR ORAL EVERY 12 HOURS
Qty: 0 | Refills: 0 | Status: COMPLETED | OUTPATIENT
Start: 2019-01-10 | End: 2019-01-11

## 2019-01-10 RX ORDER — LIDOCAINE 4 G/100G
1 CREAM TOPICAL DAILY
Qty: 0 | Refills: 0 | Status: DISCONTINUED | OUTPATIENT
Start: 2019-01-10 | End: 2019-01-13

## 2019-01-10 RX ORDER — DEXAMETHASONE 0.5 MG/5ML
6 ELIXIR ORAL EVERY 8 HOURS
Qty: 0 | Refills: 0 | Status: DISCONTINUED | OUTPATIENT
Start: 2019-01-10 | End: 2019-01-10

## 2019-01-10 RX ORDER — SODIUM CHLORIDE 0.65 %
1 AEROSOL, SPRAY (ML) NASAL EVERY 12 HOURS
Qty: 0 | Refills: 0 | Status: DISCONTINUED | OUTPATIENT
Start: 2019-01-10 | End: 2019-01-13

## 2019-01-10 RX ADMIN — Medication 1 TABLET(S): at 11:18

## 2019-01-10 RX ADMIN — SENNA PLUS 2 TABLET(S): 8.6 TABLET ORAL at 21:24

## 2019-01-10 RX ADMIN — OXYCODONE HYDROCHLORIDE 10 MILLIGRAM(S): 5 TABLET ORAL at 05:30

## 2019-01-10 RX ADMIN — Medication 100 MILLIGRAM(S): at 05:34

## 2019-01-10 RX ADMIN — Medication 5 MILLIGRAM(S): at 11:19

## 2019-01-10 RX ADMIN — OXYCODONE HYDROCHLORIDE 10 MILLIGRAM(S): 5 TABLET ORAL at 16:39

## 2019-01-10 RX ADMIN — Medication 5 MILLIGRAM(S): at 02:27

## 2019-01-10 RX ADMIN — OXYCODONE HYDROCHLORIDE 10 MILLIGRAM(S): 5 TABLET ORAL at 15:46

## 2019-01-10 RX ADMIN — Medication 102 MILLIGRAM(S): at 17:12

## 2019-01-10 RX ADMIN — OXYCODONE HYDROCHLORIDE 10 MILLIGRAM(S): 5 TABLET ORAL at 06:30

## 2019-01-10 RX ADMIN — OXYCODONE HYDROCHLORIDE 10 MILLIGRAM(S): 5 TABLET ORAL at 09:31

## 2019-01-10 RX ADMIN — LIDOCAINE 1 PATCH: 4 CREAM TOPICAL at 12:12

## 2019-01-10 RX ADMIN — Medication 5 MILLIGRAM(S): at 21:24

## 2019-01-10 RX ADMIN — Medication 1 SPRAY(S): at 14:20

## 2019-01-10 RX ADMIN — Medication 100 MILLIGRAM(S): at 21:24

## 2019-01-10 RX ADMIN — OXYCODONE HYDROCHLORIDE 10 MILLIGRAM(S): 5 TABLET ORAL at 10:25

## 2019-01-10 RX ADMIN — LIDOCAINE 1 PATCH: 4 CREAM TOPICAL at 17:13

## 2019-01-10 RX ADMIN — Medication 100 MILLIGRAM(S): at 14:20

## 2019-01-10 RX ADMIN — BENZOCAINE AND MENTHOL 1 LOZENGE: 5; 1 LIQUID ORAL at 05:36

## 2019-01-10 NOTE — PROGRESS NOTE ADULT - SUBJECTIVE AND OBJECTIVE BOX
pt seen and examined nad avss    pe dressing cdi   nvi   power 5/5   sensation grossly intact   no calf tenderness    imp sp psf    plan   drain  pt   pain control  scd  xrays

## 2019-01-10 NOTE — PROGRESS NOTE ADULT - SUBJECTIVE AND OBJECTIVE BOX
Pain Management Progress Note - [facility] [phone]    HPI: Patient seen during morning rounds, in NAD. Continue to c/o pain, states current helps to control pain. Patient is also c/o nasal congestion, RN aware.       Pertinent PMH: Pain at: ___Back __x_Neck___Knee ___Hip ___Shoulder ___ Opioid tolerance    Pain is ___ sharp ____dull __x_burning _x__achy ___ Intensity: ____ mild __x__mod ____severe     Location __x___surgical site _x____cervical _____lumbar ____abd _____upper ext____lower ext    Worse with ___x_activity _x___movement _____physical therapy___ Rest    Improved with __x__medication ___x_rest ____physical therapy    lactated ringers.  HYDROmorphone  Injectable  ceFAZolin   IVPB  dexamethasone  IVPB  aluminum hydroxide/magnesium hydroxide/simethicone Suspension  metoclopramide Injectable  docusate sodium  magnesium hydroxide Suspension  senna  multivitamin  oxyCODONE    IR  HYDROmorphone  Injectable  ceFAZolin  Injectable.  benzocaine 15 mG/menthol 3.6 mG (Sugar-Free) Lozenge  diazepam    Tablet  lidocaine   Patch  diazepam    Tablet  sodium chloride 0.65% Nasal  dexamethasone  Injectable  dexamethasone  IVPB  methylPREDNISolone      ROS: Const:  __-_febrile   Eyes:___ENT:___CV: _-__chest pain  Resp: _-___sob  GI:___nausea ___vomiting ____abd pain ___npo ___clears ___full diet __bm  :___ Musk: __x_pain ___spasm  Skin:___ Neuro:  ___sedation___confusion____ numbness ___weakness ___paresthesia  Psych:___anxiety  Endo:___ Heme:___Allergy:___  __x__ all other systems reviewed and negative     01-10 @ 06:82906 mL/min/1.73M2      Hemoglobin: 12.2 g/dL (01-10 @ 06:18)  Hemoglobin: 12.5 g/dL (01-09 @ 05:40)      T(C): 36.3 (01-10-19 @ 09:18), Max: 37 (01-09-19 @ 15:44)  HR: 80 (01-10-19 @ 09:18) (80 - 93)  BP: 142/86 (01-10-19 @ 09:18) (108/66 - 142/86)  RR: 16 (01-10-19 @ 09:18) (16 - 20)  SpO2: 98% (01-10-19 @ 09:18) (93% - 98%)  Wt(kg): --     PHYSICAL EXAM:  Gen Appearance: __x_no acute distress _x__appropriate      Neuro: ___SILT feet____ EOM Intact Psych: AAOX3__, __x_mood/affect appropriate        Eyes: ___conjunctiva WNL  _____ Pupils equal and round        ENT: __x_ears and nose atraumatic_x__ Hearing grossly intact        Neck: x___trachea midline, no visible masses ___thyroid without palpable mass    Resp: _x__Nml WOB____No tactile fremitus ___clear to auscultation    Cardio: _x__extremities free from edema __x__pedal pulses palpable    GI/Abdomen: ___soft ____x_ Nontender___x___Nondistended_____HSM    Lymphatic: ___no palpable nodes in neck  ___no palpable nodes calves and feet    Skin/Wound: ___Incision, _x__Dressing c/d/i,   ____surrounding tissues soft,  ___drain/chest tube present____    Muscular: EHL _5__/5  Gastrocnemius_5__/5    __x_absent clubbing/cyanosis         ASSESSMENT:  This is a 58y old Female with a history of:  CERVICAL [id number]  CERVICAL RADICULOPATHY  Handoff  MEWS Score  Cervical radiculopathy  No pertinent past medical history  Attention deficit disorder, unspecified hyperactivity presence  Asthma, unspecified asthma severity, unspecified whether complicated, unspecified whether persistent  Cervical disc herniation  Cervical radiculopathy  Cervical discectomy with fusion of cervical spine  H/O arthroscopy of shoulder  History of appendectomy        Recommended Treatment PLAN:    1. Continue current regimen as patient is tolerating and responding well.  Plan discussed with Dr. Nye

## 2019-01-10 NOTE — PROGRESS NOTE ADULT - SUBJECTIVE AND OBJECTIVE BOX
Pt seen and examined. Pt reported she continues to have pain in L trap, radiating to neck, and that Valium was helpful. Pt stated she continues to have discomfort with swallowing.    Focused exam:  ACDF C4-6 cervical collar in place  Dsg c/d/i  HVx1  b/l UE 5/5 , bi/tri/delt  b/l UE SILT and WWP      A/P: 59yo female s/p ACDF C4-6    -DVT PPX: SCDs  -WBS: WBAT  -Pain control: Recs appreciated from Pain Mgmt team, Valium added for muscle tightness, Lidocaine patch added for L trap/neck pain  -Disp: Pending    Recs appreciated from ENT & SLP. Pt to remain on full liquids for 1-2 days, diet to be advanced thereafter as tolerated. Pt encouraged to try cold foods and drink water throughout the day. Pt seen and examined. Pt reported she continues to have pain in L trap, radiating to neck, and that Valium was helpful. Pt stated she continues to have discomfort with swallowing.    Focused exam:  ACDF C4-6 cervical collar in place  Dsg c/d/i  HVx1  b/l UE 5/5 , bi/tri/delt  b/l UE SILT and WWP      A/P: 59yo female s/p ACDF C4-6    -DVT PPX: SCDs  -WBS: WBAT  -Pain control: Recs appreciated from Pain Mgmt team, Valium added for muscle tightness, Lidocaine patch added for L trap/neck pain  -Disp: Pending    Recs appreciated from ENT & SLP. Pt to remain on full liquids for 1-2 days, diet to be advanced thereafter as tolerated. Pt encouraged to try cold foods and drink water throughout the day. Per Dr. Torres, patient to gain steroid taper (10mg decadron IV q12h x2 doses then convert to PO). Pt seen and examined. Pt reported she continues to have pain in L trap, radiating to neck, and that Valium was helpful. Pt stated she continues to have discomfort with swallowing.    Focused exam:  ACDF C4-6 cervical collar in place  Dsg c/d/i  HVx1  b/l UE 5/5 , bi/tri/delt  b/l UE SILT and WWP      A/P: 57yo female s/p ACDF C4-6    -DVT PPX: SCDs  -WBS: WBAT  -Pain control: Recs appreciated from Pain Mgmt team, Valium added for muscle tightness, Lidocaine patch added for L trap/neck pain  -Disp: Pending    Recs appreciated from ENT & SLP. Pt to remain on full liquids for 1-2 days, diet to be advanced thereafter as tolerated. Pt encouraged to try cold foods and drink water throughout the day.     Per Dr. Torres, patient to gain steroid taper (10mg decadron IV q12h x2 doses then convert to PO). HVx1 d/c'd.

## 2019-01-10 NOTE — PROGRESS NOTE ADULT - SUBJECTIVE AND OBJECTIVE BOX
Patient is a 58y old  Female who presents with a chief complaint of neck pain (10 Micahel 2019 15:02)      INTERVAL HPI/OVERNIGHT EVENTS:          ROS  (- ) headache  ( -  )fevers/chills  ( - ) dyspnea  (  - ) cough  (  - ) chest pain  (  - ) palpatations  ( - ) dizziness/lightheadedness  (  - ) nausea/vomiting  (  - ) abd pain  (  - ) diarrhea  (  - ) melena  (  - ) hematochezia  (  - ) dysuria   ( - ) hematuria  (  - ) leg swelling    ( - ) calf tenderness  (  - ) motor weakness  ( - ) extremity numbness  ( - ) back pain  ( + ) tolerating POs  ( + ) BM  ROS: 12 point review of systems otherwise negative              MEDICATIONS  (STANDING):  dexamethasone  IVPB 10 milliGRAM(s) IV Intermittent every 12 hours  diazepam    Tablet 5 milliGRAM(s) Oral every 8 hours  docusate sodium 100 milliGRAM(s) Oral three times a day  lidocaine   Patch 1 Patch Transdermal daily  multivitamin 1 Tablet(s) Oral daily  senna 2 Tablet(s) Oral at bedtime    MEDICATIONS  (PRN):  aluminum hydroxide/magnesium hydroxide/simethicone Suspension 30 milliLiter(s) Oral every 12 hours PRN Indigestion  benzocaine 15 mG/menthol 3.6 mG (Sugar-Free) Lozenge 1 Lozenge Oral every 4 hours PRN Sore Throat  HYDROmorphone  Injectable 0.5 milliGRAM(s) IV Push every 15 minutes PRN BREAKTHROUGH PAIN  HYDROmorphone  Injectable 0.5 milliGRAM(s) IV Push every 2 hours PRN breakthrough pain  magnesium hydroxide Suspension 30 milliLiter(s) Oral every 12 hours PRN Constipation  metoclopramide Injectable 10 milliGRAM(s) IV Push once PRN Nausea and/or Vomiting  oxyCODONE    IR 5 milliGRAM(s) Oral every 4 hours PRN Moderate Pain (4 - 6)  oxyCODONE    IR 10 milliGRAM(s) Oral every 4 hours PRN Severe Pain (7 - 10)  sodium chloride 0.65% Nasal 1 Spray(s) Both Nostrils every 12 hours PRN Nasal Congestion      Allergies    No Known Allergies    Intolerances          Vital Signs Last 24 Hrs  T(C): 36.9 (10 Michael 2019 15:30), Max: 37 (09 Jan 2019 21:57)  T(F): 98.5 (10 Michael 2019 15:30), Max: 98.6 (09 Jan 2019 21:57)  HR: 67 (10 Michael 2019 15:30) (67 - 93)  BP: 116/72 (10 Michael 2019 15:30) (109/63 - 142/86)  BP(mean): --  RR: 15 (10 Michael 2019 15:30) (15 - 20)  SpO2: 95% (10 Michael 2019 15:30) (95% - 98%)  CAPILLARY BLOOD GLUCOSE          01-09 @ 07:01  -  01-10 @ 07:00  --------------------------------------------------------  IN: 340 mL / OUT: 610 mL / NET: -270 mL    01-10 @ 07:01  - 01-10 @ 17:15  --------------------------------------------------------  IN: 420 mL / OUT: 300 mL / NET: 120 mL        Physical Exam:    Daily     Daily      No Restraints    LABS:                        12.2   15.9  )-----------( 219      ( 10 Michael 2019 06:18 )             38.4     01-10    141  |  101  |  16  ----------------------------<  94  3.8   |  26  |  0.56    Ca    8.9      10 Michael 2019 06:18    TPro  6.2  /  Alb  3.5  /  TBili  0.4  /  DBili  x   /  AST  16  /  ALT  12  /  AlkPhos  58  01-10            RADIOLOGY & ADDITIONAL TESTS: Patient is a 58y old  Female who presents with a chief complaint of neck pain (10 Michael 2019 15:02)      INTERVAL HPI/OVERNIGHT EVENTS:    still with odynophagia and dysphagia (unchanged since surgery)      ROS     ( -  )fevers/chills  ( - ) dyspnea  (  - ) cough  (  - ) chest pain  (  - ) palpatations  ( - ) dizziness/lightheadedness  (  - ) nausea/vomiting  (  - ) abd pain  (  - ) diarrhea  (  - ) melena  (  - ) hematochezia  (  - ) dysuria   ( - ) hematuria  (  - ) leg swelling    ( - ) calf tenderness           ( + ) tolerating POs  ( + ) BM  ROS: 12 point review of systems otherwise negative              MEDICATIONS  (STANDING):  dexamethasone  IVPB 10 milliGRAM(s) IV Intermittent every 12 hours  diazepam    Tablet 5 milliGRAM(s) Oral every 8 hours  docusate sodium 100 milliGRAM(s) Oral three times a day  lidocaine   Patch 1 Patch Transdermal daily  multivitamin 1 Tablet(s) Oral daily  senna 2 Tablet(s) Oral at bedtime    MEDICATIONS  (PRN):  aluminum hydroxide/magnesium hydroxide/simethicone Suspension 30 milliLiter(s) Oral every 12 hours PRN Indigestion  benzocaine 15 mG/menthol 3.6 mG (Sugar-Free) Lozenge 1 Lozenge Oral every 4 hours PRN Sore Throat  HYDROmorphone  Injectable 0.5 milliGRAM(s) IV Push every 15 minutes PRN BREAKTHROUGH PAIN  HYDROmorphone  Injectable 0.5 milliGRAM(s) IV Push every 2 hours PRN breakthrough pain  magnesium hydroxide Suspension 30 milliLiter(s) Oral every 12 hours PRN Constipation  metoclopramide Injectable 10 milliGRAM(s) IV Push once PRN Nausea and/or Vomiting  oxyCODONE    IR 5 milliGRAM(s) Oral every 4 hours PRN Moderate Pain (4 - 6)  oxyCODONE    IR 10 milliGRAM(s) Oral every 4 hours PRN Severe Pain (7 - 10)  sodium chloride 0.65% Nasal 1 Spray(s) Both Nostrils every 12 hours PRN Nasal Congestion      Allergies    No Known Allergies    Intolerances          Vital Signs Last 24 Hrs  T(C): 36.9 (10 Michael 2019 15:30), Max: 37 (09 Jan 2019 21:57)  T(F): 98.5 (10 Michael 2019 15:30), Max: 98.6 (09 Jan 2019 21:57)  HR: 67 (10 Michael 2019 15:30) (67 - 93)  BP: 116/72 (10 Michael 2019 15:30) (109/63 - 142/86)  BP(mean): --  RR: 15 (10 Michael 2019 15:30) (15 - 20)  SpO2: 95% (10 Michael 2019 15:30) (95% - 98%)  CAPILLARY BLOOD GLUCOSE          01-09 @ 07:01  -  01-10 @ 07:00  --------------------------------------------------------  IN: 340 mL / OUT: 610 mL / NET: -270 mL    01-10 @ 07:01  -  01-10 @ 17:15  --------------------------------------------------------  IN: 420 mL / OUT: 300 mL / NET: 120 mL        Physical Exam:    Daily     Daily      nad  c collar on   anterior neck dressings clean/dry  s1 s2 nml, rrr  cta bilat  (+) bs soft, nt nd  cn ii -xii intact, aao x 3, 4+/5 str all 4  ext      No Restraints    LABS:                        12.2   15.9  )-----------( 219      ( 10 Michael 2019 06:18 )             38.4     01-10    141  |  101  |  16  ----------------------------<  94  3.8   |  26  |  0.56    Ca    8.9      10 Michael 2019 06:18    TPro  6.2  /  Alb  3.5  /  TBili  0.4  /  DBili  x   /  AST  16  /  ALT  12  /  AlkPhos  58  01-10            RADIOLOGY & ADDITIONAL TESTS:

## 2019-01-11 LAB
ANION GAP SERPL CALC-SCNC: 15 MMOL/L — SIGNIFICANT CHANGE UP (ref 5–17)
BASOPHILS NFR BLD AUTO: 0.1 % — SIGNIFICANT CHANGE UP (ref 0–2)
BUN SERPL-MCNC: 13 MG/DL — SIGNIFICANT CHANGE UP (ref 7–23)
CALCIUM SERPL-MCNC: 9 MG/DL — SIGNIFICANT CHANGE UP (ref 8.4–10.5)
CHLORIDE SERPL-SCNC: 101 MMOL/L — SIGNIFICANT CHANGE UP (ref 96–108)
CO2 SERPL-SCNC: 25 MMOL/L — SIGNIFICANT CHANGE UP (ref 22–31)
CREAT SERPL-MCNC: 0.52 MG/DL — SIGNIFICANT CHANGE UP (ref 0.5–1.3)
EOSINOPHIL NFR BLD AUTO: 0.1 % — SIGNIFICANT CHANGE UP (ref 0–6)
GLUCOSE SERPL-MCNC: 105 MG/DL — HIGH (ref 70–99)
HCT VFR BLD CALC: 39.8 % — SIGNIFICANT CHANGE UP (ref 34.5–45)
HGB BLD-MCNC: 12.8 G/DL — SIGNIFICANT CHANGE UP (ref 11.5–15.5)
LYMPHOCYTES # BLD AUTO: 29.5 % — SIGNIFICANT CHANGE UP (ref 13–44)
MCHC RBC-ENTMCNC: 29.2 PG — SIGNIFICANT CHANGE UP (ref 27–34)
MCHC RBC-ENTMCNC: 32.2 G/DL — SIGNIFICANT CHANGE UP (ref 32–36)
MCV RBC AUTO: 90.7 FL — SIGNIFICANT CHANGE UP (ref 80–100)
MONOCYTES NFR BLD AUTO: 5.4 % — SIGNIFICANT CHANGE UP (ref 2–14)
NEUTROPHILS NFR BLD AUTO: 64.9 % — SIGNIFICANT CHANGE UP (ref 43–77)
PLATELET # BLD AUTO: 224 K/UL — SIGNIFICANT CHANGE UP (ref 150–400)
POTASSIUM SERPL-MCNC: 4 MMOL/L — SIGNIFICANT CHANGE UP (ref 3.5–5.3)
POTASSIUM SERPL-SCNC: 4 MMOL/L — SIGNIFICANT CHANGE UP (ref 3.5–5.3)
RBC # BLD: 4.39 M/UL — SIGNIFICANT CHANGE UP (ref 3.8–5.2)
RBC # FLD: 14.3 % — SIGNIFICANT CHANGE UP (ref 10.3–16.9)
SODIUM SERPL-SCNC: 141 MMOL/L — SIGNIFICANT CHANGE UP (ref 135–145)
WBC # BLD: 10.7 K/UL — HIGH (ref 3.8–10.5)
WBC # FLD AUTO: 10.7 K/UL — HIGH (ref 3.8–10.5)

## 2019-01-11 PROCEDURE — 99233 SBSQ HOSP IP/OBS HIGH 50: CPT

## 2019-01-11 RX ORDER — OXYCODONE HYDROCHLORIDE 5 MG/1
1 TABLET ORAL
Qty: 25 | Refills: 0 | OUTPATIENT
Start: 2019-01-11 | End: 2019-01-15

## 2019-01-11 RX ORDER — IBUPROFEN 200 MG
1 TABLET ORAL
Qty: 0 | Refills: 0 | COMMUNITY

## 2019-01-11 RX ORDER — LIDOCAINE 4 G/100G
1 CREAM TOPICAL
Qty: 0 | Refills: 0 | COMMUNITY
Start: 2019-01-11

## 2019-01-11 RX ORDER — SENNA PLUS 8.6 MG/1
2 TABLET ORAL
Qty: 0 | Refills: 0 | COMMUNITY
Start: 2019-01-11

## 2019-01-11 RX ORDER — OXYCODONE HYDROCHLORIDE 5 MG/1
1 TABLET ORAL
Qty: 25 | Refills: 0
Start: 2019-01-11 | End: 2019-01-17

## 2019-01-11 RX ORDER — DOCUSATE SODIUM 100 MG
1 CAPSULE ORAL
Qty: 0 | Refills: 0 | COMMUNITY
Start: 2019-01-11

## 2019-01-11 RX ADMIN — OXYCODONE HYDROCHLORIDE 10 MILLIGRAM(S): 5 TABLET ORAL at 17:20

## 2019-01-11 RX ADMIN — OXYCODONE HYDROCHLORIDE 10 MILLIGRAM(S): 5 TABLET ORAL at 14:07

## 2019-01-11 RX ADMIN — Medication 100 MILLIGRAM(S): at 11:15

## 2019-01-11 RX ADMIN — Medication 24 MILLIGRAM(S): at 13:07

## 2019-01-11 RX ADMIN — Medication 5 MILLIGRAM(S): at 06:11

## 2019-01-11 RX ADMIN — OXYCODONE HYDROCHLORIDE 10 MILLIGRAM(S): 5 TABLET ORAL at 08:50

## 2019-01-11 RX ADMIN — LIDOCAINE 1 PATCH: 4 CREAM TOPICAL at 23:32

## 2019-01-11 RX ADMIN — OXYCODONE HYDROCHLORIDE 10 MILLIGRAM(S): 5 TABLET ORAL at 01:14

## 2019-01-11 RX ADMIN — Medication 100 MILLIGRAM(S): at 06:11

## 2019-01-11 RX ADMIN — OXYCODONE HYDROCHLORIDE 10 MILLIGRAM(S): 5 TABLET ORAL at 09:50

## 2019-01-11 RX ADMIN — OXYCODONE HYDROCHLORIDE 10 MILLIGRAM(S): 5 TABLET ORAL at 13:07

## 2019-01-11 RX ADMIN — Medication 1 TABLET(S): at 11:15

## 2019-01-11 RX ADMIN — LIDOCAINE 1 PATCH: 4 CREAM TOPICAL at 01:11

## 2019-01-11 RX ADMIN — Medication 102 MILLIGRAM(S): at 06:11

## 2019-01-11 RX ADMIN — OXYCODONE HYDROCHLORIDE 10 MILLIGRAM(S): 5 TABLET ORAL at 01:45

## 2019-01-11 RX ADMIN — OXYCODONE HYDROCHLORIDE 10 MILLIGRAM(S): 5 TABLET ORAL at 21:42

## 2019-01-11 RX ADMIN — SENNA PLUS 2 TABLET(S): 8.6 TABLET ORAL at 21:42

## 2019-01-11 RX ADMIN — Medication 100 MILLIGRAM(S): at 21:42

## 2019-01-11 RX ADMIN — LIDOCAINE 1 PATCH: 4 CREAM TOPICAL at 11:15

## 2019-01-11 RX ADMIN — OXYCODONE HYDROCHLORIDE 10 MILLIGRAM(S): 5 TABLET ORAL at 22:42

## 2019-01-11 RX ADMIN — LIDOCAINE 1 PATCH: 4 CREAM TOPICAL at 19:54

## 2019-01-11 NOTE — PROGRESS NOTE ADULT - SUBJECTIVE AND OBJECTIVE BOX
Pain Management Progress Note - [facility] [phone]    HPI: Patient seen during morning rounds, in NAD.       Pertinent PMH: Pain at: ___Back ___Neck___Knee ___Hip ___Shoulder ___ Opioid tolerance    Pain is ___ sharp ____dull ___burning ___achy ___ Intensity: ____ mild ____mod ____severe     Location _____surgical site _____cervical _____lumbar ____abd _____upper ext____lower ext    Worse with ____activity ____movement _____physical therapy___ Rest    Improved with ____medication ____rest ____physical therapy      lactated ringers.  HYDROmorphone  Injectable  ceFAZolin   IVPB  dexamethasone  IVPB  aluminum hydroxide/magnesium hydroxide/simethicone Suspension  metoclopramide Injectable  docusate sodium  magnesium hydroxide Suspension  senna  multivitamin  oxyCODONE    IR  HYDROmorphone  Injectable  ceFAZolin  Injectable.  benzocaine 15 mG/menthol 3.6 mG (Sugar-Free) Lozenge  diazepam    Tablet  lidocaine   Patch  diazepam    Tablet  sodium chloride 0.65% Nasal  dexamethasone  Injectable  dexamethasone  IVPB  methylPREDNISolone      ROS: Const:  __-_febrile   Eyes:___ENT:___CV: _-__chest pain  Resp: __-__sob  GI:___nausea ___vomiting ____abd pain ___npo ___clears ___full diet __bm  :___ Musk: __x_pain ___spasm  Skin:___ Neuro:  ___sedation___confusion____ numbness ___weakness ___paresthesia  Psych:___anxiety  Endo:___ Heme:___Allergy:___  __x__ all other systems reviewed and negative     01-11 @ 06:03919 mL/min/1.73M2      Hemoglobin: 12.8 g/dL (01-11 @ 06:59)  Hemoglobin: 12.2 g/dL (01-10 @ 06:18)        T(C): 36.1 (01-11-19 @ 08:59), Max: 37.1 (01-10-19 @ 20:45)  HR: 78 (01-11-19 @ 08:59) (67 - 78)  BP: 122/74 (01-11-19 @ 08:59) (106/67 - 122/74)  RR: 16 (01-11-19 @ 08:59) (15 - 17)  SpO2: 95% (01-11-19 @ 08:59) (95% - 96%)  Wt(kg): --     PHYSICAL EXAM:  Gen Appearance: __x_no acute distress _x__appropriate       Neuro: _x__SILT feet____ EOM Intact Psych: AAOX_3_, __x_mood/affect appropriate        Eyes: _x__conjunctiva WNL  _____ Pupils equal and round        ENT: __x_ears and nose atraumatic__x_ Hearing grossly intact        Neck: _x__trachea midline, no visible masses ___thyroid without palpable mass    Resp: _x__Nml WOB____No tactile fremitus ___clear to auscultation    Cardio: __x_extremities free from edema ___x_pedal pulses palpable    GI/Abdomen: ___soft ___x__ Nontender___x___Nondistended_____HSM    Lymphatic: ___no palpable nodes in neck  ___no palpable nodes calves and feet    Skin/Wound: ___Incision, __x_Dressing c/d/i,   ____surrounding tissues soft,  ___drain/chest tube present____    Muscular: EHL __5_/5  Gastrocnemius__5_/5    _x__absent clubbing/cyanosis         ASSESSMENT:  This is a 58y old Female with a history of:  CERVICAL [id number]  CERVICAL RADICULOPATHY  Handoff  MEWS Score  Cervical radiculopathy  No pertinent past medical history  Attention deficit disorder, unspecified hyperactivity presence  Asthma, unspecified asthma severity, unspecified whether complicated, unspecified whether persistent  Cervical disc herniation  Cervical radiculopathy  Cervical discectomy with fusion of cervical spine  H/O arthroscopy of shoulder  History of appendectomy        Recommended Treatment PLAN:    1. Continue current regimen as patient is tolerating and responding well.   Plan discussed with Dr. Frias

## 2019-01-11 NOTE — DIETITIAN INITIAL EVALUATION ADULT. - NUTRITION INTERVENTION
Collaboration and Referral of Nutrition Care/Meals and Snack/Medical Food Supplements/Nutrition Education

## 2019-01-11 NOTE — DIETITIAN INITIAL EVALUATION ADULT. - OTHER INFO
57yo F POD3 s/p ACDF approach to cervical discectomy and cervical fusion. Pt reports difficulty swallowing since Sx. Pt was seen by ENT and SLP w/ recommendations for pureed thins. Pt is currently on a Full Liquid diet and tolerating PO. Minimal intake, had pudding for breakfast. Discussed w/ pt option of Ensure Enlive for additional kcal/pro until comfortable eating more solid consistencies. Denies N/V, last BM yesterday. Reports wt gain over last year w/ UBW being 64kg and current BW being 73kg. Pt attributes it to the holidays, increased intake and less physical activity. NKFA or dietary restrictions. Skin: surgical incision; GI: WDL per flowsheet.

## 2019-01-11 NOTE — PROGRESS NOTE ADULT - SUBJECTIVE AND OBJECTIVE BOX
ENT Update Note    Pt seen and reevaluated. Improved throat pain after steroids. Observed to swallow pudding and liquids easily at bedside with no clinical signs of aspiration. Clear voice.    Can progress diet as tolerated by patient. Softs may be preferred in the postop period.  Continue steroids and taper per primary team.  No contraindication to discharge from ENT perspective when medically appropriate by primary team    Discussed with attending

## 2019-01-11 NOTE — DIETITIAN INITIAL EVALUATION ADULT. - ENERGY NEEDS
Height: 5'6" Weight: 162lbs, IBW 130lbs+/-10%, %%, BMI 26.1  IBW used for calculations as pt >120% of IBW   Nutrient needs based on [facility] standards of care for maintenance in adults.   Needs adjusted for post-op

## 2019-01-11 NOTE — PROGRESS NOTE ADULT - SUBJECTIVE AND OBJECTIVE BOX
CC: Feeling better than yesterday.   Able to swallow without problems.   Still has some pain on left side of neck and left shoulder.   Rest of ROS negative.    Vital Signs Last 24 Hrs  T(C): 36.1 (11 Jan 2019 08:59), Max: 37.1 (10 Michael 2019 20:45)  T(F): 96.9 (11 Jan 2019 08:59), Max: 98.8 (10 Michael 2019 20:45)  HR: 78 (11 Jan 2019 08:59) (67 - 78)  BP: 122/74 (11 Jan 2019 08:59) (106/67 - 122/74)  BP(mean): --  RR: 16 (11 Jan 2019 08:59) (15 - 17)  SpO2: 95% (11 Jan 2019 08:59) (95% - 96%)    PHYSICAL EXAMINATION  * General: Not in acute distress. Awake and alert. Lying comfortably in bed.  * Head: Normocephalic, atraumatic.  * HEENT: ears no discharge, eyes PERRLA, nose no discharge, throat no exudates, normal tonsils.  * Neck: no JVD, supple.  * Lungs: Clear to auscultation, no rales, no wheezes.  * Cardio: Regular rate and rhythm, no murmurs, no rubs, no gallops. Good peripheral pulses.  * Abdomen: Soft, non-tender, non-distended, tympanic to percussion, no rebound, no guarding, no rigidity. Bowel sounds present. No suprapubic or CVA tenderness.  * : Deferred.  * Extremities: Acyanotic, no edema.  * Skin: Warm and dry.  * Neuro: Alert and oriented x 3. No focal deficits. Motor strength is 5/5 throughout. Sensation intact. Cranial nerves II-XII grossly intact.                           12.8   10.7  )-----------( 224      ( 11 Jan 2019 06:59 )             39.8   01-11    141  |  101  |  13  ----------------------------<  105<H>  4.0   |  25  |  0.52    Ca    9.0      11 Jan 2019 06:59    TPro  6.2  /  Alb  3.5  /  TBili  0.4  /  DBili  x   /  AST  16  /  ALT  12  /  AlkPhos  58  01-10    MEDICATIONS  (STANDING):  docusate sodium 100 milliGRAM(s) Oral three times a day  lidocaine   Patch 1 Patch Transdermal daily  methylPREDNISolone 24 milliGRAM(s) Oral once  multivitamin 1 Tablet(s) Oral daily  senna 2 Tablet(s) Oral at bedtime    MEDICATIONS  (PRN):  aluminum hydroxide/magnesium hydroxide/simethicone Suspension 30 milliLiter(s) Oral every 12 hours PRN Indigestion  benzocaine 15 mG/menthol 3.6 mG (Sugar-Free) Lozenge 1 Lozenge Oral every 4 hours PRN Sore Throat  HYDROmorphone  Injectable 0.5 milliGRAM(s) IV Push every 15 minutes PRN BREAKTHROUGH PAIN  HYDROmorphone  Injectable 0.5 milliGRAM(s) IV Push every 2 hours PRN breakthrough pain  magnesium hydroxide Suspension 30 milliLiter(s) Oral every 12 hours PRN Constipation  metoclopramide Injectable 10 milliGRAM(s) IV Push once PRN Nausea and/or Vomiting  oxyCODONE    IR 5 milliGRAM(s) Oral every 4 hours PRN Moderate Pain (4 - 6)  oxyCODONE    IR 10 milliGRAM(s) Oral every 4 hours PRN Severe Pain (7 - 10)  sodium chloride 0.65% Nasal 1 Spray(s) Both Nostrils every 12 hours PRN Nasal Congestion

## 2019-01-11 NOTE — PROGRESS NOTE ADULT - SUBJECTIVE AND OBJECTIVE BOX
Pt seen and examined. Pt reported gaining better control of neck/trap pain and ability to swallow is improving.    Focused exam:  ACDF C4-6 cervical collar in place  Dsg c/d/i  b/l UE 5/5 , bi/tri/delt  b/l UE SILT and WWP      A/P: 59yo female s/p ACDF C4-6    -DVT PPX: SCDs  -WBS: WBAT  -Pain control: Recs appreciated from Pain Mgmt team  -Continue steroid taper  -Full liquid diet, advance as tolerated  -Disp: Pending    Further recs from ENT pending.

## 2019-01-12 LAB
ANION GAP SERPL CALC-SCNC: 13 MMOL/L — SIGNIFICANT CHANGE UP (ref 5–17)
BUN SERPL-MCNC: 17 MG/DL — SIGNIFICANT CHANGE UP (ref 7–23)
CALCIUM SERPL-MCNC: 9 MG/DL — SIGNIFICANT CHANGE UP (ref 8.4–10.5)
CHLORIDE SERPL-SCNC: 101 MMOL/L — SIGNIFICANT CHANGE UP (ref 96–108)
CO2 SERPL-SCNC: 25 MMOL/L — SIGNIFICANT CHANGE UP (ref 22–31)
CREAT SERPL-MCNC: 0.61 MG/DL — SIGNIFICANT CHANGE UP (ref 0.5–1.3)
GLUCOSE SERPL-MCNC: 94 MG/DL — SIGNIFICANT CHANGE UP (ref 70–99)
HCT VFR BLD CALC: 40.3 % — SIGNIFICANT CHANGE UP (ref 34.5–45)
HGB BLD-MCNC: 13.2 G/DL — SIGNIFICANT CHANGE UP (ref 11.5–15.5)
MCHC RBC-ENTMCNC: 29.4 PG — SIGNIFICANT CHANGE UP (ref 27–34)
MCHC RBC-ENTMCNC: 32.8 G/DL — SIGNIFICANT CHANGE UP (ref 32–36)
MCV RBC AUTO: 89.8 FL — SIGNIFICANT CHANGE UP (ref 80–100)
PLATELET # BLD AUTO: 250 K/UL — SIGNIFICANT CHANGE UP (ref 150–400)
POTASSIUM SERPL-MCNC: 3.7 MMOL/L — SIGNIFICANT CHANGE UP (ref 3.5–5.3)
POTASSIUM SERPL-SCNC: 3.7 MMOL/L — SIGNIFICANT CHANGE UP (ref 3.5–5.3)
RBC # BLD: 4.49 M/UL — SIGNIFICANT CHANGE UP (ref 3.8–5.2)
RBC # FLD: 14.2 % — SIGNIFICANT CHANGE UP (ref 10.3–16.9)
SODIUM SERPL-SCNC: 139 MMOL/L — SIGNIFICANT CHANGE UP (ref 135–145)
WBC # BLD: 12 K/UL — HIGH (ref 3.8–10.5)
WBC # FLD AUTO: 12 K/UL — HIGH (ref 3.8–10.5)

## 2019-01-12 PROCEDURE — 99231 SBSQ HOSP IP/OBS SF/LOW 25: CPT

## 2019-01-12 RX ORDER — ONDANSETRON 8 MG/1
4 TABLET, FILM COATED ORAL ONCE
Qty: 0 | Refills: 0 | Status: DISCONTINUED | OUTPATIENT
Start: 2019-01-12 | End: 2019-01-13

## 2019-01-12 RX ADMIN — Medication 1 TABLET(S): at 12:16

## 2019-01-12 RX ADMIN — OXYCODONE HYDROCHLORIDE 5 MILLIGRAM(S): 5 TABLET ORAL at 22:05

## 2019-01-12 RX ADMIN — Medication 30 MILLILITER(S): at 21:09

## 2019-01-12 RX ADMIN — Medication 100 MILLIGRAM(S): at 05:26

## 2019-01-12 RX ADMIN — OXYCODONE HYDROCHLORIDE 10 MILLIGRAM(S): 5 TABLET ORAL at 06:25

## 2019-01-12 RX ADMIN — Medication 4 MILLIGRAM(S): at 18:52

## 2019-01-12 RX ADMIN — OXYCODONE HYDROCHLORIDE 5 MILLIGRAM(S): 5 TABLET ORAL at 21:05

## 2019-01-12 RX ADMIN — LIDOCAINE 1 PATCH: 4 CREAM TOPICAL at 12:16

## 2019-01-12 RX ADMIN — OXYCODONE HYDROCHLORIDE 5 MILLIGRAM(S): 5 TABLET ORAL at 12:25

## 2019-01-12 RX ADMIN — LIDOCAINE 1 PATCH: 4 CREAM TOPICAL at 19:17

## 2019-01-12 RX ADMIN — OXYCODONE HYDROCHLORIDE 5 MILLIGRAM(S): 5 TABLET ORAL at 13:25

## 2019-01-12 RX ADMIN — SENNA PLUS 2 TABLET(S): 8.6 TABLET ORAL at 21:08

## 2019-01-12 RX ADMIN — OXYCODONE HYDROCHLORIDE 10 MILLIGRAM(S): 5 TABLET ORAL at 05:25

## 2019-01-12 RX ADMIN — Medication 100 MILLIGRAM(S): at 21:07

## 2019-01-12 RX ADMIN — Medication 8 MILLIGRAM(S): at 21:07

## 2019-01-12 RX ADMIN — Medication 30 MILLILITER(S): at 06:39

## 2019-01-12 RX ADMIN — Medication 4 MILLIGRAM(S): at 06:39

## 2019-01-12 RX ADMIN — Medication 100 MILLIGRAM(S): at 13:49

## 2019-01-12 RX ADMIN — Medication 4 MILLIGRAM(S): at 13:49

## 2019-01-12 NOTE — PROGRESS NOTE ADULT - ASSESSMENT
59yo F w/o significant PMH other than chronic neck pain and cervical radiculopathy for many years. Admitted for elective ACDF by Dr. Donaldson. Now POD-4 of ACDF C4-C6.  Medicine consulted for comanagement    1) Post-op state  -OOB-C  - Pain control and bowel regimen - pain controlled today.  - PT, Incentive spirometry    2) cervical radiculopathy  s/p ACDF  - Care per Ortho.  likely DC today    3) Ppx  -Ambulatory/ SCDs    Medically optimized for discharge.
59yo F w/o significant PMH other than chronic neck pain and cervical radiculopathy for many years. Admitted fro elective ACDF by Dr. Donaldson. Now POD-3 of ACDF C4-C6.  Medicine consulted for comanagement    1) Post-op state  * OOB-C  * Pain control and bowel regimen.   * PT  * Incentive spirometry    2) cervical radiculopathy  s/p ACDF  * Care per Ortho    3) Ppx  * Ambulatory/ SCDs    Medically optimized for discharge.
57yo F w/o significant PMH other than chronic neck pain and cervical radiculopathy for many years. Admitted fro elective ACDF by Dr. Donaldson. Now POD-1 of ACDF C4-C6.  Medicine consulted for comanagement    dysphagia/odynophagia  -f/u ENT recs     Post-op state  -OOB-C  - Pain control and bowel regimen.         cervical radiculopathy  -s/p ACDF  - Care per Ortho     Ppx  -Ambulatory/ SCDs

## 2019-01-12 NOTE — PROGRESS NOTE ADULT - SUBJECTIVE AND OBJECTIVE BOX
Pt seen and examined at bedside, no complaints, reports pain controlled    Vital Signs Last 24 Hrs  T(C): 36.1 (12 Jan 2019 08:25), Max: 36.9 (11 Jan 2019 20:00)  T(F): 97 (12 Jan 2019 08:25), Max: 98.5 (11 Jan 2019 20:00)  HR: 66 (12 Jan 2019 08:25) (66 - 78)  BP: 126/80 (12 Jan 2019 08:25) (104/64 - 126/80)  BP(mean): --  RR: 17 (12 Jan 2019 08:25) (16 - 17)  SpO2: 96% (12 Jan 2019 08:25) (94% - 96%)    AA and O x3 NAD  ncat   neck supple  s1s2 RRR  lungs cta b/l  abd soft NTND  ext no edema, moving all extremities    Complete Blood Count (01.12.19 @ 07:57)    WBC Count: 12.0 K/uL    RBC Count: 4.49 M/uL    Hemoglobin: 13.2 g/dL    Hematocrit: 40.3 %    Mean Cell Volume: 89.8 fL    Mean Cell Hemoglobin: 29.4 pg    Mean Cell Hemoglobin Conc: 32.8 g/dL    Red Cell Distrib Width: 14.2 %    Platelet Count - Automated: 250 K/uL  Basic Metabolic Panel (01.12.19 @ 07:57)    Sodium, Serum: 139 mmol/L    Potassium, Serum: 3.7 mmol/L    Chloride, Serum: 101 mmol/L    Carbon Dioxide, Serum: 25 mmol/L    Anion Gap, Serum: 13 mmol/L    Blood Urea Nitrogen, Serum: 17: Checked result, consistent with patient history mg/dL    Creatinine, Serum: 0.61 mg/dL    Glucose, Serum: 94 mg/dL    Calcium, Total Serum: 9.0 mg/dL    eGFR if Non : 101: The units for eGFR are ml/min/1.73m2 (normalized body surface area). The  eGFR is calculated from a serum creatinine using the CKD-EPI equation.  Other variables required for calculation are race, age and sex. Among  patients with chronic kidney disease (CKD), the eGFR is useful in  determining the stage of disease according to KDOQI CKD classification.  All eGFR results are reported numerically with the following  interpretation.          GFR                    With                        Without     (ml/min/1.73 m2)    Kidney Damage       Kidney Damage        >= 90                    Stage 1                     Normal        60-89                    Stage 2                     Decreased GFR        30-59                    Stage 3         Stage 3        15-29                    Stage 4                     Stage 4        < 15                      Stage 5                     Stage 5  Each stage of CKD assumes that the associated GFR level has been in  effect for at least 3 months. Determination of stages one and two (with  eGFR > 59 ml/min/m2) requires estimation of kidney damage for at least 3  months as defined by structural or functional abnormalities.  Limitations: All estimates of GFR will be less accurate for patientsat  extremes of muscle mass (including but not limited to frail elderly,  critically ill, or cancer patients), those with unusual diets, and those  with conditions associated with reduced secretion or extrarenal  elimination of creatinine. The eGFR equation is not recommended for use  in patients with unstable creatinine levels. mL/min/1.73M2    eGFR if : 117: The units for eGFR are ml/min/1.73m2 (normalized body surface area). The  eGFR is calculated from a serum creatinine using the CKD-EPI equation.  Other variables required for calculation are race, age and sex. Among  patients with chronic kidney disease (CKD), the eGFR is useful in  determining the stage of disease according to KDOQI CKD classification.  All eGFR results are reported numerically with the following  interpretation.          GFR                    With                        Without     (ml/min/1.73 m2)    Kidney Damage       Kidney Damage        >= 90                    Stage 1                     Normal        60-89                    Stage 2                     Decreased GFR        30-59                    Stage 3         Stage 3        15-29                    Stage 4                     Stage 4        < 15                      Stage 5                     Stage 5  Each stage of CKD assumes that the associated GFR level has been in  effect for at least 3 months. Determination of stages one and two (with  eGFR > 59 ml/min/m2) requires estimation of kidney damage for at least 3  months as defined by structural or functional abnormalities.  Limitations: All estimates of GFR will be less accurate for patientsat  extremes of muscle mass (including but not limited to frail elderly,  critically ill, or cancer patients), those with unusual diets, and those  with conditions associated with reduced secretion or extrarenal  elimination of creatinine. The eGFR equation is not recommended for use  in patients with unstable creatinine levels. mL/min/1.73M2

## 2019-01-12 NOTE — PROGRESS NOTE ADULT - REASON FOR ADMISSION
neck pain

## 2019-01-12 NOTE — PROGRESS NOTE ADULT - SUBJECTIVE AND OBJECTIVE BOX
pt seen and examined nad avss    pe dressing cdi   nvi   power 5/5   sensation grossly intact   no calf tenderness    imp sp psf    plan   drain  pt   pain control  scd  Dc to home

## 2019-01-13 VITALS
DIASTOLIC BLOOD PRESSURE: 72 MMHG | HEART RATE: 84 BPM | SYSTOLIC BLOOD PRESSURE: 125 MMHG | OXYGEN SATURATION: 98 % | TEMPERATURE: 96 F | RESPIRATION RATE: 15 BRPM

## 2019-01-13 PROCEDURE — 95940 IONM IN OPERATNG ROOM 15 MIN: CPT

## 2019-01-13 PROCEDURE — 36415 COLL VENOUS BLD VENIPUNCTURE: CPT

## 2019-01-13 PROCEDURE — 88304 TISSUE EXAM BY PATHOLOGIST: CPT

## 2019-01-13 PROCEDURE — 92612 ENDOSCOPY SWALLOW (FEES) VID: CPT

## 2019-01-13 PROCEDURE — 80053 COMPREHEN METABOLIC PANEL: CPT

## 2019-01-13 PROCEDURE — 86900 BLOOD TYPING SEROLOGIC ABO: CPT

## 2019-01-13 PROCEDURE — 85027 COMPLETE CBC AUTOMATED: CPT

## 2019-01-13 PROCEDURE — 76000 FLUOROSCOPY <1 HR PHYS/QHP: CPT

## 2019-01-13 PROCEDURE — 86850 RBC ANTIBODY SCREEN: CPT

## 2019-01-13 PROCEDURE — 80048 BASIC METABOLIC PNL TOTAL CA: CPT

## 2019-01-13 PROCEDURE — C1713: CPT

## 2019-01-13 PROCEDURE — 86901 BLOOD TYPING SEROLOGIC RH(D): CPT

## 2019-01-13 PROCEDURE — C1889: CPT

## 2019-01-13 PROCEDURE — 85025 COMPLETE CBC W/AUTO DIFF WBC: CPT

## 2019-01-13 PROCEDURE — 72040 X-RAY EXAM NECK SPINE 2-3 VW: CPT

## 2019-01-13 RX ORDER — SENNA PLUS 8.6 MG/1
2 TABLET ORAL
Qty: 20 | Refills: 0 | OUTPATIENT
Start: 2019-01-13 | End: 2019-01-22

## 2019-01-13 RX ORDER — DOCUSATE SODIUM 100 MG
1 CAPSULE ORAL
Qty: 30 | Refills: 0 | OUTPATIENT
Start: 2019-01-13 | End: 2019-01-22

## 2019-01-13 RX ADMIN — LIDOCAINE 1 PATCH: 4 CREAM TOPICAL at 11:40

## 2019-01-13 RX ADMIN — LIDOCAINE 1 PATCH: 4 CREAM TOPICAL at 00:49

## 2019-01-13 RX ADMIN — OXYCODONE HYDROCHLORIDE 10 MILLIGRAM(S): 5 TABLET ORAL at 06:45

## 2019-01-13 RX ADMIN — Medication 100 MILLIGRAM(S): at 05:46

## 2019-01-13 RX ADMIN — MAGNESIUM HYDROXIDE 30 MILLILITER(S): 400 TABLET, CHEWABLE ORAL at 05:46

## 2019-01-13 RX ADMIN — OXYCODONE HYDROCHLORIDE 10 MILLIGRAM(S): 5 TABLET ORAL at 05:45

## 2019-01-13 RX ADMIN — Medication 1 TABLET(S): at 11:40

## 2019-01-13 RX ADMIN — Medication 4 MILLIGRAM(S): at 07:26

## 2019-01-13 RX ADMIN — OXYCODONE HYDROCHLORIDE 10 MILLIGRAM(S): 5 TABLET ORAL at 11:40

## 2019-01-16 DIAGNOSIS — M54.12 RADICULOPATHY, CERVICAL REGION: ICD-10-CM

## 2019-01-16 DIAGNOSIS — F17.210 NICOTINE DEPENDENCE, CIGARETTES, UNCOMPLICATED: ICD-10-CM

## 2019-01-16 DIAGNOSIS — J45.909 UNSPECIFIED ASTHMA, UNCOMPLICATED: ICD-10-CM

## 2019-01-16 DIAGNOSIS — F98.8 OTHER SPECIFIED BEHAVIORAL AND EMOTIONAL DISORDERS WITH ONSET USUALLY OCCURRING IN CHILDHOOD AND ADOLESCENCE: ICD-10-CM

## 2019-01-16 DIAGNOSIS — R13.10 DYSPHAGIA, UNSPECIFIED: ICD-10-CM

## 2022-01-31 ENCOUNTER — EMERGENCY (EMERGENCY)
Facility: HOSPITAL | Age: 62
LOS: 1 days | Discharge: HOME | End: 2022-01-31
Attending: EMERGENCY MEDICINE | Admitting: EMERGENCY MEDICINE
Payer: MEDICAID

## 2022-01-31 VITALS
DIASTOLIC BLOOD PRESSURE: 72 MMHG | OXYGEN SATURATION: 97 % | WEIGHT: 141.1 LBS | TEMPERATURE: 98 F | HEIGHT: 66 IN | HEART RATE: 84 BPM | RESPIRATION RATE: 22 BRPM | SYSTOLIC BLOOD PRESSURE: 107 MMHG

## 2022-01-31 VITALS
OXYGEN SATURATION: 96 % | SYSTOLIC BLOOD PRESSURE: 106 MMHG | TEMPERATURE: 98 F | RESPIRATION RATE: 18 BRPM | HEART RATE: 64 BPM | DIASTOLIC BLOOD PRESSURE: 70 MMHG

## 2022-01-31 DIAGNOSIS — E78.5 HYPERLIPIDEMIA, UNSPECIFIED: ICD-10-CM

## 2022-01-31 DIAGNOSIS — R00.2 PALPITATIONS: ICD-10-CM

## 2022-01-31 DIAGNOSIS — Z90.49 ACQUIRED ABSENCE OF OTHER SPECIFIED PARTS OF DIGESTIVE TRACT: Chronic | ICD-10-CM

## 2022-01-31 DIAGNOSIS — I10 ESSENTIAL (PRIMARY) HYPERTENSION: ICD-10-CM

## 2022-01-31 DIAGNOSIS — R42 DIZZINESS AND GIDDINESS: ICD-10-CM

## 2022-01-31 DIAGNOSIS — R11.0 NAUSEA: ICD-10-CM

## 2022-01-31 DIAGNOSIS — Z98.890 OTHER SPECIFIED POSTPROCEDURAL STATES: Chronic | ICD-10-CM

## 2022-01-31 DIAGNOSIS — Z20.822 CONTACT WITH AND (SUSPECTED) EXPOSURE TO COVID-19: ICD-10-CM

## 2022-01-31 LAB
ALBUMIN SERPL ELPH-MCNC: 4.5 G/DL — SIGNIFICANT CHANGE UP (ref 3.3–5)
ALP SERPL-CCNC: 72 U/L — SIGNIFICANT CHANGE UP (ref 40–120)
ALT FLD-CCNC: 15 U/L — SIGNIFICANT CHANGE UP (ref 10–45)
ANION GAP SERPL CALC-SCNC: 15 MMOL/L — SIGNIFICANT CHANGE UP (ref 5–17)
AST SERPL-CCNC: 22 U/L — SIGNIFICANT CHANGE UP (ref 10–40)
BASOPHILS # BLD AUTO: 0.08 K/UL — SIGNIFICANT CHANGE UP (ref 0–0.2)
BASOPHILS NFR BLD AUTO: 0.9 % — SIGNIFICANT CHANGE UP (ref 0–2)
BILIRUB SERPL-MCNC: 0.5 MG/DL — SIGNIFICANT CHANGE UP (ref 0.2–1.2)
BUN SERPL-MCNC: 11 MG/DL — SIGNIFICANT CHANGE UP (ref 7–23)
CALCIUM SERPL-MCNC: 10 MG/DL — SIGNIFICANT CHANGE UP (ref 8.4–10.5)
CHLORIDE SERPL-SCNC: 104 MMOL/L — SIGNIFICANT CHANGE UP (ref 96–108)
CO2 SERPL-SCNC: 22 MMOL/L — SIGNIFICANT CHANGE UP (ref 22–31)
CREAT SERPL-MCNC: 0.71 MG/DL — SIGNIFICANT CHANGE UP (ref 0.5–1.3)
EOSINOPHIL # BLD AUTO: 0.09 K/UL — SIGNIFICANT CHANGE UP (ref 0–0.5)
EOSINOPHIL NFR BLD AUTO: 1 % — SIGNIFICANT CHANGE UP (ref 0–6)
GLUCOSE SERPL-MCNC: 103 MG/DL — HIGH (ref 70–99)
HCT VFR BLD CALC: 42.5 % — SIGNIFICANT CHANGE UP (ref 34.5–45)
HGB BLD-MCNC: 14.1 G/DL — SIGNIFICANT CHANGE UP (ref 11.5–15.5)
IMM GRANULOCYTES NFR BLD AUTO: 0.2 % — SIGNIFICANT CHANGE UP (ref 0–1.5)
LYMPHOCYTES # BLD AUTO: 3.91 K/UL — HIGH (ref 1–3.3)
LYMPHOCYTES # BLD AUTO: 42.2 % — SIGNIFICANT CHANGE UP (ref 13–44)
MCHC RBC-ENTMCNC: 29.7 PG — SIGNIFICANT CHANGE UP (ref 27–34)
MCHC RBC-ENTMCNC: 33.2 GM/DL — SIGNIFICANT CHANGE UP (ref 32–36)
MCV RBC AUTO: 89.7 FL — SIGNIFICANT CHANGE UP (ref 80–100)
MONOCYTES # BLD AUTO: 0.68 K/UL — SIGNIFICANT CHANGE UP (ref 0–0.9)
MONOCYTES NFR BLD AUTO: 7.3 % — SIGNIFICANT CHANGE UP (ref 2–14)
NEUTROPHILS # BLD AUTO: 4.49 K/UL — SIGNIFICANT CHANGE UP (ref 1.8–7.4)
NEUTROPHILS NFR BLD AUTO: 48.4 % — SIGNIFICANT CHANGE UP (ref 43–77)
NRBC # BLD: 0 /100 WBCS — SIGNIFICANT CHANGE UP (ref 0–0)
PLATELET # BLD AUTO: 290 K/UL — SIGNIFICANT CHANGE UP (ref 150–400)
POTASSIUM SERPL-MCNC: 4.4 MMOL/L — SIGNIFICANT CHANGE UP (ref 3.5–5.3)
POTASSIUM SERPL-SCNC: 4.4 MMOL/L — SIGNIFICANT CHANGE UP (ref 3.5–5.3)
PROT SERPL-MCNC: 7.4 G/DL — SIGNIFICANT CHANGE UP (ref 6–8.3)
RBC # BLD: 4.74 M/UL — SIGNIFICANT CHANGE UP (ref 3.8–5.2)
RBC # FLD: 14.8 % — HIGH (ref 10.3–14.5)
SARS-COV-2 RNA SPEC QL NAA+PROBE: SIGNIFICANT CHANGE UP
SODIUM SERPL-SCNC: 141 MMOL/L — SIGNIFICANT CHANGE UP (ref 135–145)
WBC # BLD: 9.27 K/UL — SIGNIFICANT CHANGE UP (ref 3.8–10.5)
WBC # FLD AUTO: 9.27 K/UL — SIGNIFICANT CHANGE UP (ref 3.8–10.5)

## 2022-01-31 PROCEDURE — 36415 COLL VENOUS BLD VENIPUNCTURE: CPT

## 2022-01-31 PROCEDURE — 93010 ELECTROCARDIOGRAM REPORT: CPT

## 2022-01-31 PROCEDURE — U0003: CPT

## 2022-01-31 PROCEDURE — 99285 EMERGENCY DEPT VISIT HI MDM: CPT | Mod: 25

## 2022-01-31 PROCEDURE — U0005: CPT

## 2022-01-31 PROCEDURE — 70450 CT HEAD/BRAIN W/O DYE: CPT | Mod: MA

## 2022-01-31 PROCEDURE — 80053 COMPREHEN METABOLIC PANEL: CPT

## 2022-01-31 PROCEDURE — 85025 COMPLETE CBC W/AUTO DIFF WBC: CPT

## 2022-01-31 PROCEDURE — 70450 CT HEAD/BRAIN W/O DYE: CPT | Mod: 26,MA

## 2022-01-31 PROCEDURE — 93005 ELECTROCARDIOGRAM TRACING: CPT

## 2022-01-31 RX ORDER — SODIUM CHLORIDE 9 MG/ML
1000 INJECTION INTRAMUSCULAR; INTRAVENOUS; SUBCUTANEOUS ONCE
Refills: 0 | Status: COMPLETED | OUTPATIENT
Start: 2022-01-31 | End: 2022-01-31

## 2022-01-31 RX ORDER — MECLIZINE HCL 12.5 MG
12.5 TABLET ORAL ONCE
Refills: 0 | Status: COMPLETED | OUTPATIENT
Start: 2022-01-31 | End: 2022-01-31

## 2022-01-31 RX ORDER — MECLIZINE HCL 12.5 MG
1 TABLET ORAL
Qty: 15 | Refills: 0
Start: 2022-01-31

## 2022-01-31 RX ADMIN — Medication 12.5 MILLIGRAM(S): at 14:20

## 2022-01-31 RX ADMIN — SODIUM CHLORIDE 1000 MILLILITER(S): 9 INJECTION INTRAMUSCULAR; INTRAVENOUS; SUBCUTANEOUS at 14:19

## 2022-01-31 NOTE — ED PROVIDER NOTE - PATIENT PORTAL LINK FT
You can access the FollowMyHealth Patient Portal offered by [facility] by registering at the following website: [email or web address]. By joining [employer]’s FollowMyHealth portal, you will also be able to view your health information using other applications (apps) compatible with our system.

## 2022-01-31 NOTE — ED ADULT NURSE NOTE - OBJECTIVE STATEMENT
Patient a+o x4, c/o b/l flank pain intermittently xmonths "felt worse this Friday and Saturday", with n/v/d, referred by PCP for elevated K of 6.1. Speaking in full sentences without difficulty, denies weakness/cp/sob. IV initiated, labs collected and sent, ekg performed; tolerated well. Safety measures initiated, comfort measures rendered. MD at bedside.

## 2022-01-31 NOTE — ED PROVIDER NOTE - CLINICAL SUMMARY MEDICAL DECISION MAKING FREE TEXT BOX
Impression: Hx of HTN and HLD, referred to ED for elevated K of 6.1 on outpatient lab last week, here w/ vertiginous-type  symptoms starting 3 days ago w/ associated nausea. Neuro exam nonfocal. EKG nonischemic. Plan to check labs including K levels, CT head, give meclizine for dizziness, and reassess. Impression: Hx of HTN and HLD, referred to ED for elevated K of 6.1 on outpatient lab last week, here w/ vertiginous-type  symptoms starting 3 days ago w/ associated nausea. Neuro exam nonfocal with no cerebellar signs. EKG nonischemic. Plan to check labs including K levels, CT head, give meclizine for dizziness, and reassess.    Labs reassuring w/ normal electrolytes including k. CT head neg for acute findings. Pt tx'd w/ meclizine and feeling improved. Do not suspect central vertigo. ED evaluation and management discussed with the patient and Dr. Dixon in detail.  Close PMD follow up encouraged.  Strict ED return instructions discussed in detail and patient given the opportunity to ask any questions about their discharge diagnosis and instructions. Patient verbalized understanding.

## 2022-01-31 NOTE — ED PROVIDER NOTE - NSFOLLOWUPINSTRUCTIONS_ED_ALL_ED_FT
Take meclizine every 8 hours as needed for dizziness.  Follow up with Dr. Dixon for re-evaluation.  Return to er for any new or worsening symptoms (worsening dizziness, persistent vomiting, chest pain, difficulty breathing, numbness, tingling, weakness...).                  Log Out.      [employer]® CareNotes®     :  [facility]  	                       VERTIGO - AfterCare(R) Instructions(ER/ED)           Vertigo    WHAT YOU NEED TO KNOW:    Vertigo is a condition that causes you to feel dizzy. You may feel that you or everything around you is moving or spinning. You may also feel like you are being pulled down or toward your side.     DISCHARGE INSTRUCTIONS:    Return to the emergency department if:   •You have a headache and a stiff neck.      •You have shaking chills and a fever.       •You vomit over and over with no relief.       •You have blood, pus, or fluid coming out of your ears.      •You are confused.       Contact your healthcare provider if:   •Your symptoms do not get better with treatment.       •You have questions about your condition or care.      Medicines:   •Medicine may be given to help relieve your symptoms.      •Take your medicine as directed. Contact your healthcare provider if you think your medicine is not helping or if you have side effects. Tell him or her if you are allergic to any medicine. Keep a list of the medicines, vitamins, and herbs you take. Include the amounts, and when and why you take them. Bring the list or the pill bottles to follow-up visits. Carry your medicine list with you in case of an emergency.      Manage your symptoms:   •Do not drive, walk without help, or operate heavy machinery when you are dizzy.       •Move slowly when you move from one position to another position. Get up slowly from sitting or lying down. Sit or lie down right away if you feel dizzy.      •Drink plenty of liquids. Liquids help prevent dehydration. Ask how much liquid to drink each day and which liquids are best for you.      •Vestibular and balance rehabilitation therapy (VBRT) is used to teach you exercises to improve your balance and strength. These exercises may help decrease your vertigo and improve your balance. Ask for more information about this therapy.      Follow up with your doctor as directed: Write down your questions so you remember to ask them during your visits.                           © Copyright [employer] 2022

## 2022-01-31 NOTE — ED ADULT NURSE NOTE - CHIEF COMPLAINT QUOTE
Pt sent by PCP for elevated K 6.1 on outpatient labs, co SOB, N/V/D x3 days. Pt tachypneic in triage area. Unsure of medical hx states, "I had a catheter for something at [facility]." Denies headache, dizziness.

## 2022-01-31 NOTE — ED PROVIDER NOTE - OBJECTIVE STATEMENT
60 y/o F w/ Hx of HTN, HLD, cervical radiculopathy, presents today w/ dizziness that started 3 days ago described as room-spinning sensation w/ associated nausea. No vomiting. States she had outpatient labs 5 days ago and was notified today for elevated K of 6.1 and referred to ED for evaluation. Denies SOB despite triage note. Endorses to palpitations described as heart pounding. No numbness, tingling, weakness, acute visual changes, speech changes, gait difficulty, or falls. Reports Hx of similar type dizziness episodes on and off for  several months, not on home meds. 60 y/o F w/ Hx of HTN, HLD, cervical radiculopathy, presents today w/ dizziness that started 3 days ago described as room-spinning w/ associated nausea. No vomiting. States she had outpatient labs 5 days ago and was notified today for elevated K of 6.1 and referred to ED for evaluation. Denies SOB despite triage note. Endorses palpitations described as heart pounding, no chest pain, syncope. No numbness, tingling, weakness, acute visual changes, speech changes, gait difficulty, or falls. Reports Hx of similar type dizzy episodes on and off for several months, not on meds.

## 2022-01-31 NOTE — ED ADULT TRIAGE NOTE - CHIEF COMPLAINT QUOTE
Pt sent by PCP for elevated K 6.1 on outpatient labs, co SOB, N/V/D x3 days. Pt tachypneic in triage area. Unsure of medical hx states, "I had a catheter for something at [facility]." Denies headache, dizziness.

## 2022-01-31 NOTE — ED PROVIDER NOTE - CROS ED NEURO NEG
no numbness, no tingling, no weakness, no speech changes, no visual changes/no difficulty walking/imbalance

## 2022-01-31 NOTE — ED PROVIDER NOTE - PHYSICAL EXAMINATION
VITAL SIGNS: I have reviewed nursing notes and confirm.  CONSTITUTIONAL: In no acute distress.   SKIN:  warm and dry, no acute rash.   HEAD:  normocephalic, atraumatic.  EYES: Horizontal nystagmus; conjunctiva and sclera clear.  ENT: No nasal discharge; airway clear.   NECK: Supple; non tender.  CARD: S1, S2 normal; no murmurs, gallops, or rubs. Regular rate and rhythm.   RESP:  Clear to auscultation b/l, no wheezes, rales or rhonchi.  ABD: Normal bowel sounds; soft; non-distended; non-tender; no guarding/ rebound.  EXT: Normal ROM. No clubbing, cyanosis or edema. 2+ pulses to b/l ue/le.  NEURO: Alert and oriented x3. Cranial nerves grossly intact. Motor and sensation intact and equal bilaterally. Negative pronator drift. Finger to nose, heel to shin, rapid alternating movements intact. Negative ataxia.    PSYCH: Cooperative, mood and affect appropriate. VITAL SIGNS: I have reviewed nursing notes and confirm.  CONSTITUTIONAL: In no acute distress.   SKIN:  warm and dry, no acute rash.   HEAD:  normocephalic, atraumatic.  EYES: PERRLA. + Horizontal nystagmus; conjunctiva and sclera clear.  ENT: No nasal discharge; airway clear.   NECK: Supple; non tender.  CARD: S1, S2 normal; no murmurs, gallops, or rubs. Regular rate and rhythm.   RESP:  Clear to auscultation b/l, no wheezes, rales or rhonchi.  ABD: Normal bowel sounds; soft; non-distended; non-tender; no guarding/ rebound.  EXT: Normal ROM. No clubbing, cyanosis or edema. 2+ pulses to b/l ue/le.  NEURO: Alert and oriented x3. Cranial nerves grossly intact. Motor and sensation intact and equal bilaterally. Negative pronator drift. Finger to nose, heel to shin, rapid alternating movements intact. Negative ataxia.    PSYCH: Cooperative, mood and affect appropriate.

## 2022-01-31 NOTE — ED PROVIDER NOTE - NSICDXPASTMEDICALHX_GEN_ALL_CORE_FT
PAST MEDICAL HISTORY:  Cervical radiculopathy       HLD (hyperlipidemia)     HTN (hypertension)

## 2023-07-12 NOTE — PROGRESS NOTE ADULT - PROVIDER SPECIALTY LIST ADULT
ENT
ENT
Internal Medicine
Orthopedics
Pain Medicine
unknown

## 2023-11-24 NOTE — ED PROVIDER NOTE - NSCAREINITIATED _GEN_ER
Azalea Padilla DO  Authorized by: Azalea Padilla DO  Universal Vijaya, Starr(Attending)

## 2025-06-12 NOTE — ED ADULT NURSE NOTE - NSFALLRSKPASTHIST_ED_ALL_ED
No care due was identified.  Health [facility] Embedded Care Due Messages. Reference number: [id number].   6/12/2025 11:46:04 AM CDT   no